# Patient Record
Sex: MALE | Race: WHITE | NOT HISPANIC OR LATINO | Employment: FULL TIME | URBAN - METROPOLITAN AREA
[De-identification: names, ages, dates, MRNs, and addresses within clinical notes are randomized per-mention and may not be internally consistent; named-entity substitution may affect disease eponyms.]

---

## 2024-01-24 ENCOUNTER — APPOINTMENT (OUTPATIENT)
Dept: CARDIOLOGY | Facility: HOSPITAL | Age: 29
End: 2024-01-24
Payer: MEDICAID

## 2024-01-24 ENCOUNTER — HOSPITAL ENCOUNTER (EMERGENCY)
Facility: HOSPITAL | Age: 29
Discharge: PSYCHIATRIC OR SUBSTANCE USE FACILITY | End: 2024-01-25
Attending: STUDENT IN AN ORGANIZED HEALTH CARE EDUCATION/TRAINING PROGRAM
Payer: MEDICAID

## 2024-01-24 VITALS
DIASTOLIC BLOOD PRESSURE: 82 MMHG | RESPIRATION RATE: 18 BRPM | TEMPERATURE: 97.5 F | SYSTOLIC BLOOD PRESSURE: 116 MMHG | OXYGEN SATURATION: 100 % | HEART RATE: 108 BPM

## 2024-01-24 DIAGNOSIS — F25.0 SCHIZOAFFECTIVE DISORDER, BIPOLAR TYPE (MULTI): ICD-10-CM

## 2024-01-24 DIAGNOSIS — N40.1 BENIGN PROSTATIC HYPERPLASIA WITH LOWER URINARY TRACT SYMPTOMS, SYMPTOM DETAILS UNSPECIFIED: ICD-10-CM

## 2024-01-24 DIAGNOSIS — F39 MOOD DISORDER (CMS-HCC): Primary | ICD-10-CM

## 2024-01-24 LAB
ALBUMIN SERPL BCP-MCNC: 4.6 G/DL (ref 3.4–5)
ALP SERPL-CCNC: 70 U/L (ref 33–120)
ALT SERPL W P-5'-P-CCNC: 22 U/L (ref 10–52)
AMPHETAMINES UR QL SCN: ABNORMAL
ANION GAP SERPL CALC-SCNC: 12 MMOL/L (ref 10–20)
APAP SERPL-MCNC: <10 UG/ML
AST SERPL W P-5'-P-CCNC: 17 U/L (ref 9–39)
BARBITURATES UR QL SCN: ABNORMAL
BASOPHILS # BLD AUTO: 0.06 X10*3/UL (ref 0–0.1)
BASOPHILS NFR BLD AUTO: 0.6 %
BENZODIAZ UR QL SCN: ABNORMAL
BILIRUB SERPL-MCNC: 0.4 MG/DL (ref 0–1.2)
BUN SERPL-MCNC: 15 MG/DL (ref 6–23)
BZE UR QL SCN: ABNORMAL
CALCIUM SERPL-MCNC: 9.5 MG/DL (ref 8.6–10.3)
CANNABINOIDS UR QL SCN: ABNORMAL
CHLORIDE SERPL-SCNC: 103 MMOL/L (ref 98–107)
CO2 SERPL-SCNC: 28 MMOL/L (ref 21–32)
CREAT SERPL-MCNC: 1.03 MG/DL (ref 0.5–1.3)
EGFRCR SERPLBLD CKD-EPI 2021: >90 ML/MIN/1.73M*2
EOSINOPHIL # BLD AUTO: 0.12 X10*3/UL (ref 0–0.7)
EOSINOPHIL NFR BLD AUTO: 1.2 %
ERYTHROCYTE [DISTWIDTH] IN BLOOD BY AUTOMATED COUNT: 12.5 % (ref 11.5–14.5)
ETHANOL SERPL-MCNC: <10 MG/DL
FENTANYL+NORFENTANYL UR QL SCN: ABNORMAL
GLUCOSE SERPL-MCNC: 101 MG/DL (ref 74–99)
HCT VFR BLD AUTO: 41.7 % (ref 41–52)
HGB BLD-MCNC: 14 G/DL (ref 13.5–17.5)
IMM GRANULOCYTES # BLD AUTO: 0.05 X10*3/UL (ref 0–0.7)
IMM GRANULOCYTES NFR BLD AUTO: 0.5 % (ref 0–0.9)
LYMPHOCYTES # BLD AUTO: 1.63 X10*3/UL (ref 1.2–4.8)
LYMPHOCYTES NFR BLD AUTO: 15.8 %
MCH RBC QN AUTO: 30.9 PG (ref 26–34)
MCHC RBC AUTO-ENTMCNC: 33.6 G/DL (ref 32–36)
MCV RBC AUTO: 92 FL (ref 80–100)
MONOCYTES # BLD AUTO: 0.77 X10*3/UL (ref 0.1–1)
MONOCYTES NFR BLD AUTO: 7.5 %
NEUTROPHILS # BLD AUTO: 7.68 X10*3/UL (ref 1.2–7.7)
NEUTROPHILS NFR BLD AUTO: 74.4 %
NRBC BLD-RTO: 0 /100 WBCS (ref 0–0)
OPIATES UR QL SCN: ABNORMAL
OXYCODONE+OXYMORPHONE UR QL SCN: ABNORMAL
PCP UR QL SCN: ABNORMAL
PLATELET # BLD AUTO: 234 X10*3/UL (ref 150–450)
POTASSIUM SERPL-SCNC: 4 MMOL/L (ref 3.5–5.3)
PROT SERPL-MCNC: 6.9 G/DL (ref 6.4–8.2)
RBC # BLD AUTO: 4.53 X10*6/UL (ref 4.5–5.9)
SALICYLATES SERPL-MCNC: <3 MG/DL
SARS-COV-2 RNA RESP QL NAA+PROBE: NOT DETECTED
SODIUM SERPL-SCNC: 139 MMOL/L (ref 136–145)
WBC # BLD AUTO: 10.3 X10*3/UL (ref 4.4–11.3)

## 2024-01-24 PROCEDURE — 85025 COMPLETE CBC W/AUTO DIFF WBC: CPT | Performed by: STUDENT IN AN ORGANIZED HEALTH CARE EDUCATION/TRAINING PROGRAM

## 2024-01-24 PROCEDURE — 93005 ELECTROCARDIOGRAM TRACING: CPT

## 2024-01-24 PROCEDURE — 80143 DRUG ASSAY ACETAMINOPHEN: CPT | Performed by: STUDENT IN AN ORGANIZED HEALTH CARE EDUCATION/TRAINING PROGRAM

## 2024-01-24 PROCEDURE — 80053 COMPREHEN METABOLIC PANEL: CPT | Performed by: STUDENT IN AN ORGANIZED HEALTH CARE EDUCATION/TRAINING PROGRAM

## 2024-01-24 PROCEDURE — 86140 C-REACTIVE PROTEIN: CPT | Performed by: NURSE PRACTITIONER

## 2024-01-24 PROCEDURE — 87636 SARSCOV2 & INF A&B AMP PRB: CPT | Performed by: STUDENT IN AN ORGANIZED HEALTH CARE EDUCATION/TRAINING PROGRAM

## 2024-01-24 PROCEDURE — 99285 EMERGENCY DEPT VISIT HI MDM: CPT | Performed by: STUDENT IN AN ORGANIZED HEALTH CARE EDUCATION/TRAINING PROGRAM

## 2024-01-24 PROCEDURE — 87389 HIV-1 AG W/HIV-1&-2 AB AG IA: CPT | Mod: GEALAB | Performed by: NURSE PRACTITIONER

## 2024-01-24 PROCEDURE — 83036 HEMOGLOBIN GLYCOSYLATED A1C: CPT | Mod: GEALAB | Performed by: NURSE PRACTITIONER

## 2024-01-24 PROCEDURE — 36415 COLL VENOUS BLD VENIPUNCTURE: CPT | Performed by: STUDENT IN AN ORGANIZED HEALTH CARE EDUCATION/TRAINING PROGRAM

## 2024-01-24 PROCEDURE — 84443 ASSAY THYROID STIM HORMONE: CPT | Performed by: NURSE PRACTITIONER

## 2024-01-24 PROCEDURE — 80307 DRUG TEST PRSMV CHEM ANLYZR: CPT | Performed by: STUDENT IN AN ORGANIZED HEALTH CARE EDUCATION/TRAINING PROGRAM

## 2024-01-24 RX ORDER — OLANZAPINE 5 MG/1
5 TABLET, ORALLY DISINTEGRATING ORAL ONCE
Status: DISCONTINUED | OUTPATIENT
Start: 2024-01-24 | End: 2024-01-25 | Stop reason: HOSPADM

## 2024-01-24 ASSESSMENT — COLUMBIA-SUICIDE SEVERITY RATING SCALE - C-SSRS
2. HAVE YOU ACTUALLY HAD ANY THOUGHTS OF KILLING YOURSELF?: NO
1. IN THE PAST MONTH, HAVE YOU WISHED YOU WERE DEAD OR WISHED YOU COULD GO TO SLEEP AND NOT WAKE UP?: NO
6. HAVE YOU EVER DONE ANYTHING, STARTED TO DO ANYTHING, OR PREPARED TO DO ANYTHING TO END YOUR LIFE?: NO

## 2024-01-24 ASSESSMENT — LIFESTYLE VARIABLES
HAVE YOU EVER FELT YOU SHOULD CUT DOWN ON YOUR DRINKING: NO
HAVE PEOPLE ANNOYED YOU BY CRITICIZING YOUR DRINKING: NO
EVER HAD A DRINK FIRST THING IN THE MORNING TO STEADY YOUR NERVES TO GET RID OF A HANGOVER: NO
REASON UNABLE TO ASSESS: NO
EVER FELT BAD OR GUILTY ABOUT YOUR DRINKING: NO

## 2024-01-24 ASSESSMENT — PAIN SCALES - GENERAL
PAINLEVEL_OUTOF10: 0 - NO PAIN
PAINLEVEL_OUTOF10: 0 - NO PAIN

## 2024-01-24 ASSESSMENT — PAIN - FUNCTIONAL ASSESSMENT: PAIN_FUNCTIONAL_ASSESSMENT: 0-10

## 2024-01-25 ENCOUNTER — HOSPITAL ENCOUNTER (INPATIENT)
Facility: HOSPITAL | Age: 29
LOS: 8 days | Discharge: HOME | End: 2024-02-02
Attending: PSYCHIATRY & NEUROLOGY | Admitting: PSYCHIATRY & NEUROLOGY
Payer: MEDICAID

## 2024-01-25 DIAGNOSIS — F31.13 BIPOLAR DISORDER WITH SEVERE MANIA (MULTI): Primary | ICD-10-CM

## 2024-01-25 DIAGNOSIS — R33.8 BENIGN PROSTATIC HYPERPLASIA WITH URINARY RETENTION: ICD-10-CM

## 2024-01-25 DIAGNOSIS — E55.9 VITAMIN D DEFICIENCY: ICD-10-CM

## 2024-01-25 DIAGNOSIS — N40.1 BENIGN PROSTATIC HYPERPLASIA WITH URINARY RETENTION: ICD-10-CM

## 2024-01-25 PROBLEM — F17.200 NICOTINE DEPENDENCE, UNSPECIFIED, UNCOMPLICATED: Status: ACTIVE | Noted: 2018-09-24

## 2024-01-25 PROBLEM — F23 ACUTE PSYCHOSIS (MULTI): Status: ACTIVE | Noted: 2024-01-25

## 2024-01-25 PROBLEM — F12.90 CANNABIS USE, UNSPECIFIED, UNCOMPLICATED: Status: ACTIVE | Noted: 2018-09-24

## 2024-01-25 PROBLEM — F30.9: Status: ACTIVE | Noted: 2018-09-24

## 2024-01-25 LAB
FLUAV RNA RESP QL NAA+PROBE: NOT DETECTED
FLUBV RNA RESP QL NAA+PROBE: NOT DETECTED

## 2024-01-25 PROCEDURE — 2500000004 HC RX 250 GENERAL PHARMACY W/ HCPCS (ALT 636 FOR OP/ED): Performed by: INTERNAL MEDICINE

## 2024-01-25 PROCEDURE — 99221 1ST HOSP IP/OBS SF/LOW 40: CPT | Performed by: INTERNAL MEDICINE

## 2024-01-25 PROCEDURE — 1240000001 HC SEMI-PRIVATE BH ROOM DAILY

## 2024-01-25 RX ORDER — DIPHENHYDRAMINE HYDROCHLORIDE 50 MG/ML
50 INJECTION INTRAMUSCULAR; INTRAVENOUS ONCE AS NEEDED
Status: DISCONTINUED | OUTPATIENT
Start: 2024-01-25 | End: 2024-02-02 | Stop reason: HOSPADM

## 2024-01-25 RX ORDER — LORAZEPAM 2 MG/ML
2 INJECTION INTRAMUSCULAR EVERY 6 HOURS PRN
Status: DISCONTINUED | OUTPATIENT
Start: 2024-01-25 | End: 2024-02-02 | Stop reason: HOSPADM

## 2024-01-25 RX ORDER — TAMSULOSIN HYDROCHLORIDE 0.4 MG/1
0.4 CAPSULE ORAL DAILY
Status: DISCONTINUED | OUTPATIENT
Start: 2024-01-25 | End: 2024-02-02 | Stop reason: HOSPADM

## 2024-01-25 RX ORDER — ACETAMINOPHEN 500 MG
5 TABLET ORAL NIGHTLY PRN
Status: DISCONTINUED | OUTPATIENT
Start: 2024-01-25 | End: 2024-02-02 | Stop reason: HOSPADM

## 2024-01-25 RX ORDER — LORAZEPAM 1 MG/1
2 TABLET ORAL EVERY 6 HOURS PRN
Status: DISCONTINUED | OUTPATIENT
Start: 2024-01-25 | End: 2024-02-02 | Stop reason: HOSPADM

## 2024-01-25 RX ORDER — DIPHENHYDRAMINE HCL 50 MG
50 CAPSULE ORAL EVERY 6 HOURS PRN
Status: DISCONTINUED | OUTPATIENT
Start: 2024-01-25 | End: 2024-02-02 | Stop reason: HOSPADM

## 2024-01-25 RX ORDER — HALOPERIDOL 5 MG/1
5 TABLET ORAL EVERY 6 HOURS PRN
Status: DISCONTINUED | OUTPATIENT
Start: 2024-01-25 | End: 2024-02-02 | Stop reason: HOSPADM

## 2024-01-25 RX ORDER — HYDROXYZINE PAMOATE 50 MG/1
50 CAPSULE ORAL EVERY 4 HOURS PRN
Status: DISCONTINUED | OUTPATIENT
Start: 2024-01-25 | End: 2024-01-26

## 2024-01-25 RX ORDER — HALOPERIDOL 5 MG/ML
5 INJECTION INTRAMUSCULAR EVERY 6 HOURS PRN
Status: DISCONTINUED | OUTPATIENT
Start: 2024-01-25 | End: 2024-02-02 | Stop reason: HOSPADM

## 2024-01-25 RX ORDER — ALUMINUM HYDROXIDE, MAGNESIUM HYDROXIDE, AND SIMETHICONE 2400; 240; 2400 MG/30ML; MG/30ML; MG/30ML
30 SUSPENSION ORAL EVERY 6 HOURS PRN
Status: DISCONTINUED | OUTPATIENT
Start: 2024-01-25 | End: 2024-02-02 | Stop reason: HOSPADM

## 2024-01-25 RX ORDER — ACETAMINOPHEN 325 MG/1
650 TABLET ORAL EVERY 4 HOURS PRN
Status: DISCONTINUED | OUTPATIENT
Start: 2024-01-25 | End: 2024-02-02 | Stop reason: HOSPADM

## 2024-01-25 RX ADMIN — TAMSULOSIN HYDROCHLORIDE 0.4 MG: 0.4 CAPSULE ORAL at 15:29

## 2024-01-25 SDOH — SOCIAL STABILITY: SOCIAL NETWORK: VISITOR BEHAVIORS: OTHER (COMMENT)

## 2024-01-25 SDOH — SOCIAL STABILITY: SOCIAL INSECURITY: ARE YOU OR HAVE YOU BEEN THREATENED OR ABUSED PHYSICALLY, EMOTIONALLY, OR SEXUALLY BY ANYONE?: NO

## 2024-01-25 SDOH — HEALTH STABILITY: MENTAL HEALTH

## 2024-01-25 SDOH — HEALTH STABILITY: MENTAL HEALTH: HAVE YOU WISHED YOU WERE DEAD OR WISHED YOU COULD GO TO SLEEP AND NOT WAKE UP?: NO

## 2024-01-25 SDOH — SOCIAL STABILITY: SOCIAL INSECURITY: WERE YOU ABLE TO COMPLETE ALL THE BEHAVIORAL HEALTH SCREENINGS?: YES

## 2024-01-25 SDOH — SOCIAL STABILITY: SOCIAL NETWORK: PARENT/GUARDIAN/SIGNIFICANT OTHER INVOLVEMENT: NO INVOLVEMENT

## 2024-01-25 SDOH — SOCIAL STABILITY: SOCIAL INSECURITY: DOES ANYONE TRY TO KEEP YOU FROM HAVING/CONTACTING OTHER FRIENDS OR DOING THINGS OUTSIDE YOUR HOME?: NO

## 2024-01-25 SDOH — SOCIAL STABILITY: SOCIAL INSECURITY: HAS ANYONE EVER THREATENED TO HURT YOUR FAMILY OR YOUR PETS?: NO

## 2024-01-25 SDOH — HEALTH STABILITY: MENTAL HEALTH: BEHAVIORS/MOOD: NON-COMPLIANT;WANDERING;DELUSIONS

## 2024-01-25 SDOH — HEALTH STABILITY: MENTAL HEALTH: SUICIDE ASSESSMENT: ADULT (C-SSRS)

## 2024-01-25 SDOH — SOCIAL STABILITY: SOCIAL INSECURITY: DO YOU FEEL ANYONE HAS EXPLOITED OR TAKEN ADVANTAGE OF YOU FINANCIALLY OR OF YOUR PERSONAL PROPERTY?: NO

## 2024-01-25 SDOH — SOCIAL STABILITY: SOCIAL INSECURITY: ARE THERE ANY APPARENT SIGNS OF INJURIES/BEHAVIORS THAT COULD BE RELATED TO ABUSE/NEGLECT?: NO

## 2024-01-25 SDOH — SOCIAL STABILITY: SOCIAL INSECURITY: ABUSE: ADULT

## 2024-01-25 SDOH — HEALTH STABILITY: MENTAL HEALTH: HAVE YOU ACTUALLY HAD ANY THOUGHTS OF KILLING YOURSELF?: NO

## 2024-01-25 SDOH — HEALTH STABILITY: MENTAL HEALTH: CONTENT: PREOCCUPATION

## 2024-01-25 SDOH — SOCIAL STABILITY: SOCIAL INSECURITY: DO YOU FEEL UNSAFE GOING BACK TO THE PLACE WHERE YOU ARE LIVING?: NO

## 2024-01-25 SDOH — HEALTH STABILITY: MENTAL HEALTH: HAVE YOU EVER DONE ANYTHING, STARTED TO DO ANYTHING, OR PREPARED TO DO ANYTHING TO END YOUR LIFE?: NO

## 2024-01-25 SDOH — HEALTH STABILITY: MENTAL HEALTH: SLEEP PATTERN: RESTLESSNESS;INSOMNIA

## 2024-01-25 SDOH — SOCIAL STABILITY: SOCIAL INSECURITY: FAMILY BEHAVIORS: NON-SUPPORTIVE

## 2024-01-25 SDOH — SOCIAL STABILITY: SOCIAL INSECURITY: HAVE YOU HAD THOUGHTS OF HARMING ANYONE ELSE?: NO

## 2024-01-25 ASSESSMENT — LIFESTYLE VARIABLES
SUBSTANCE_ABUSE_PAST_12_MONTHS: NO
AUDITORY DISTURBANCES: NOT PRESENT
PAROXYSMAL SWEATS: NO SWEAT VISIBLE
VISUAL DISTURBANCES: NOT PRESENT
AUDIT-C TOTAL SCORE: 0
HEADACHE, FULLNESS IN HEAD: NOT PRESENT
NAUSEA AND VOMITING: NO NAUSEA AND NO VOMITING
CIWA TOTAL SCORE: 0
HOW OFTEN DO YOU HAVE 6 OR MORE DRINKS ON ONE OCCASION: NEVER
ORIENTATION AND CLOUDING OF SENSORIUM: ORIENTED AND CAN DO SERIAL ADDITIONS
HOW MANY STANDARD DRINKS CONTAINING ALCOHOL DO YOU HAVE ON A TYPICAL DAY: PATIENT DOES NOT DRINK
AUDIT-C TOTAL SCORE: 0
SKIP TO QUESTIONS 9-10: 1
AGITATION: NORMAL ACTIVITY
PRESCIPTION_ABUSE_PAST_12_MONTHS: NO
ANXIETY: NO ANXIETY, AT EASE
TOTAL_SCORE: 0
TREMOR: NO TREMOR
HOW OFTEN DO YOU HAVE A DRINK CONTAINING ALCOHOL: NEVER

## 2024-01-25 ASSESSMENT — ACTIVITIES OF DAILY LIVING (ADL)
HEARING - RIGHT EAR: FUNCTIONAL
JUDGMENT_ADEQUATE_SAFELY_COMPLETE_DAILY_ACTIVITIES: YES
LACK_OF_TRANSPORTATION: NO
FEEDING YOURSELF: INDEPENDENT
HEARING - LEFT EAR: FUNCTIONAL
PATIENT'S MEMORY ADEQUATE TO SAFELY COMPLETE DAILY ACTIVITIES?: YES
BATHING: INDEPENDENT
TOILETING: INDEPENDENT
WALKS IN HOME: INDEPENDENT
DRESSING YOURSELF: INDEPENDENT
GROOMING: INDEPENDENT
ADEQUATE_TO_COMPLETE_ADL: YES

## 2024-01-25 ASSESSMENT — COLUMBIA-SUICIDE SEVERITY RATING SCALE - C-SSRS
6. HAVE YOU EVER DONE ANYTHING, STARTED TO DO ANYTHING, OR PREPARED TO DO ANYTHING TO END YOUR LIFE?: NO
1. SINCE LAST CONTACT, HAVE YOU WISHED YOU WERE DEAD OR WISHED YOU COULD GO TO SLEEP AND NOT WAKE UP?: NO
6. HAVE YOU EVER DONE ANYTHING, STARTED TO DO ANYTHING, OR PREPARED TO DO ANYTHING TO END YOUR LIFE?: NO
2. HAVE YOU ACTUALLY HAD ANY THOUGHTS OF KILLING YOURSELF?: NO
1. SINCE LAST CONTACT, HAVE YOU WISHED YOU WERE DEAD OR WISHED YOU COULD GO TO SLEEP AND NOT WAKE UP?: NO
2. HAVE YOU ACTUALLY HAD ANY THOUGHTS OF KILLING YOURSELF?: NO

## 2024-01-25 ASSESSMENT — PATIENT HEALTH QUESTIONNAIRE - PHQ9
SUM OF ALL RESPONSES TO PHQ9 QUESTIONS 1 & 2: 0
2. FEELING DOWN, DEPRESSED OR HOPELESS: NOT AT ALL
1. LITTLE INTEREST OR PLEASURE IN DOING THINGS: NOT AT ALL

## 2024-01-25 ASSESSMENT — PAIN SCALES - GENERAL
PAINLEVEL_OUTOF10: 0 - NO PAIN
PAINLEVEL_OUTOF10: 10 - WORST POSSIBLE PAIN

## 2024-01-25 ASSESSMENT — PAIN - FUNCTIONAL ASSESSMENT
PAIN_FUNCTIONAL_ASSESSMENT: 0-10
PAIN_FUNCTIONAL_ASSESSMENT: WONG-BAKER FACES

## 2024-01-25 NOTE — PROGRESS NOTES
" REHAB Therapy Assessment & Treatment    Patient Name: Moris Conklin  MRN: [id number]  Today's Date: 1/25/2024      Activity Assessment:  Initial Assessment  Attention Span: 5-15 Minutes  Cognitive Behavior Status/Orientation: Capable, Place, Time, Person  Crisis Triggers: Emotions, Mood (\"not having time, not having my routine\")  Emotional Concerns/Mood/Affect: Alert, Participative  Hearing: Adequate  Memory: Memory intact  Motivation Level: Moderate encouragement needed  Negative Coping Skills: Substance use (THC, isolation)  Speech/Communication/Socialization: Verbal  Vision: Adequate    Leisure Survey:   Rehab Leisure Interest Survey  Activity Preference: Independent  Activity Tolerance: Fair 15-30 minutes  At Home ADL Deficits:  (not tending to personal hygeine)  Barriers to Leisure Participation: Behaviors, Emotions, Mood/affect, Thought process, Lack of time  Community Resources: Active in community groups (Abdirashid)  Following Directions: Able to follow multi-step commands  Leisure Interests: Actively participates in leisure interests  Living Arrangement:  (family)  Motivators for Recreation/Leisure Involvement: Relaxation, Self-esteem/sense of accomplishment, Fun/entertainment, Sense of well being/contentment  Outdoor Activities:  (outdoors)  Passive Games: Video games  Patient/Family Education Needs: safety awareness  Patient Strengths: support system  Patient Weakness: isolation  Physial Activity: Fitness/exercise (MMA, wrestled in high school)  Social/Group Activities: Team sports  Solitary Activities: Music (nicole)  Work/Volunteer: \"work a lot\"- food delivery      Encounter Problems       Encounter Problems (Active)       Distress Tolerance  RT       To learn ways to manage stress       Start:  01/25/24    Expected End:  02/01/24               Leisure (appropriate exploration/participation)       Explore/identify 1-2 potentially meaniningful leisure activities for post discharge       Start:  " 01/25/24    Expected End:  02/01/24               Social       Stimulation       Start:  01/25/24    Expected End:  02/01/24

## 2024-01-25 NOTE — NURSING NOTE
Pt arrived to the unit and was cooperative with admission process and paperwork. Patient presents as irritable but cooperative. Pt informed of their psychiatric pt rights and had all questions and concerns addressed to their satisfaction. Pt oriented to unit. Will continue to monitor.

## 2024-01-25 NOTE — CONSULTS
Reason For Consult  Medical management    History Of Present Illness  Moris Conklin is a 28 y.o. male presenting with acute bipolar disorder admitted to psych floor involuntarily.  The patient was seen on the floor denies any past medical history.  The patient states he was doing just fine until he was moved with a roommate and since then he has not been able to urinate properly.  Per the patient he urinated about 10 minutes ago and everything was fine.  The patient states he has lower abdominal pain that turns into GERD and then he also has migraines.  Patient denies any fever chills nausea or vomiting or diarrhea at this time.  He is in no acute distress.  Medicine has been consulted for his difficulty urination.     Past Medical History  He has no past medical history on file.    Surgical History  He has no past surgical history on file.     Social History  He reports that he has never smoked. He has never used smokeless tobacco. No history on file for alcohol use and drug use.    Family History  No family history on file.     Allergies  Patient has no known allergies.    Review of Systems  Review of system is unremarkable except for was documented above     Physical Exam  General: Unkempt, no acute distress, alert and oriented  HEENT: PERRLA, head is normocephalic atraumatic,  Heart: Regular rate and rhythm no murmurs  Lungs: Clear to auscultate bilaterally  Abdomen: Nontender nondistended.  Nontender to deep palpation in all 4 quadrants  Psych: Depressed       Last Recorded Vitals  /76 (BP Location: Right arm, Patient Position: Sitting)   Pulse 68   Temp 36.4 °C (97.5 °F) (Temporal)   Resp 18   Wt 65.8 kg (145 lb)   SpO2 97%        Assessment/Plan     Urinary hesitancy  -Added Flomax.   -Bladder scan q. shifts for next 24 hours  -UA reviewed which is unremarkable except for proteinuria that needs outpatient follow-up.    Bipolar disorder  -Per psychiatry.    Thank you for allowing us to  participate in the care of this patient.  Will continue to follow along.  Feel free to call us and preferably send message on epic chat with any questions or concerns.    Luis Eduardo Florentino MD

## 2024-01-25 NOTE — PROGRESS NOTES
Pharmacy Medication History Review    Moris Conklin is a 28 y.o. male admitted for No Principal Problem: There is no principal problem currently on the Problem List. Please update the Problem List and refresh.. Pharmacy reviewed the patient's [id number] medications and allergies for accuracy.    The list below reflectives the updated PTA list. Please review each medication in order reconciliation for additional clarification and justification.  Prior to Admission medications    Not on File        The list below reflectives the updated allergy list. Please review each documented allergy for additional clarification and justification.  Allergies  Reviewed by Wilfrido Gamble RN on 1/24/2024   No Known Allergies         Below are additional concerns with the patient's PTA list.      Kendal Arnold CPhT

## 2024-01-25 NOTE — PROGRESS NOTES
ED care transition note      Moris Katerin  28 y.o.       I took over care of this patient at 0600    Pt signed out to me pending EPAT placement.  Signed out as medically clear.  Referred over from Jenn Grace with concern for acute psychosis and internal stimulation.  Has a history of manic episodes.    No new issues while in the emergency department.  Accepted to the [facility].          1. Mood disorder (CMS/HCC)             Vitals:    01/24/24 1716   BP: 116/82   Pulse: (!) 108   Resp: 18   Temp: 36.4 °C (97.5 °F)   SpO2: 100%        No orders to display      Labs Reviewed   COMPREHENSIVE METABOLIC PANEL - Abnormal       Result Value    Glucose 101 (*)     Sodium 139      Potassium 4.0      Chloride 103      Bicarbonate 28      Anion Gap 12      Urea Nitrogen 15      Creatinine 1.03      eGFR >90      Calcium 9.5      Albumin 4.6      Alkaline Phosphatase 70      Total Protein 6.9      AST 17      Bilirubin, Total 0.4      ALT 22     DRUG SCREEN,URINE - Abnormal    Amphetamine Screen, Urine Presumptive Negative      Barbiturate Screen, Urine Presumptive Negative      Benzodiazepines Screen, Urine Presumptive Negative      Cannabinoid Screen, Urine Presumptive Positive (*)     Cocaine Metabolite Screen, Urine Presumptive Negative      Fentanyl Screen, Urine Presumptive Negative      Opiate Screen, Urine Presumptive Negative      Oxycodone Screen, Urine Presumptive Negative      PCP Screen, Urine Presumptive Negative      Narrative:     Drug screen results are presumptive and should not be used to assess   compliance with prescribed medication. Contact the performing [facility] laboratory   to add-on definitive confirmatory testing if clinically indicated.    Toxicology screening results are reported qualitatively. The concentration must   be greater than or equal to the cutoff to be reported as positive. The concentration   at which the screening test can detect an individual drug or metabolite varies.   The absence  of expected drug(s) and/or drug metabolite(s) may indicate non-compliance,   inappropriate timing of specimen collection relative to drug administration, poor drug   absorption, diluted/adulterated urine, or limitations of testing. For medical purposes   only; not valid for forensic use.    Interpretive questions should be directed to the laboratory medical directors.   ACUTE TOXICOLOGY PANEL, BLOOD - Normal    Acetaminophen <10.0      Salicylate  <3      Alcohol <10     SARS-COV-2 PCR, SCREEN ASYMPTOMATIC - Normal    Coronavirus 2019, PCR Not Detected      Narrative:     This assay has received FDA Emergency Use Authorization (EUA) and is only authorized for the duration of time that circumstances exist to justify the authorization of the emergency use of in vitro diagnostic tests for the detection of SARS-CoV-2 virus and/or diagnosis of COVID-19 infection under section 564(b)(1) of the Act, 21 U.S.C. 360bbb-3(b)(1). This assay is an in vitro diagnostic nucleic acid amplification test for the qualitative detection of SARS-CoV-2 from nasopharyngeal specimens and has been validated for use at [facility]. Negative results do not preclude COVID-19 infections and should not be used as the sole basis for diagnosis, treatment, or other management decisions.     INFLUENZA A AND B PCR - Normal    Flu A Result Not Detected      Flu B Result Not Detected      Narrative:     This assay is an in vitro diagnostic multiplex nucleic acid amplification test for the detection and discrimination of Influenza A & B from nasopharyngeal specimens, and has been validated for use at [facility]. Negative results do not preclude Influenza A/B infections, and should not be used as the sole basis for diagnosis, treatment, or other management decisions. If Influenza A/B and RSV PCR results are negative, testing for Parainfluenza virus, Adenovirus and Metapneumovirus is routinely performed for [facility]  pediatric oncology and intensive care inpatients, and is available on other patients by placing an add-on request.   CBC WITH AUTO DIFFERENTIAL    WBC 10.3      nRBC 0.0      RBC 4.53      Hemoglobin 14.0      Hematocrit 41.7      MCV 92      MCH 30.9      MCHC 33.6      RDW 12.5      Platelets 234      Neutrophils % 74.4      Immature Granulocytes %, Automated 0.5      Lymphocytes % 15.8      Monocytes % 7.5      Eosinophils % 1.2      Basophils % 0.6      Neutrophils Absolute 7.68      Immature Granulocytes Absolute, Automated 0.05      Lymphocytes Absolute 1.63      Monocytes Absolute 0.77      Eosinophils Absolute 0.12      Basophils Absolute 0.06          Dominique Trujillo MD

## 2024-01-25 NOTE — CONSULTS
"BEHAVIORAL HEALTH INITIAL CONSULTATION NOTE    Referring Provider: Yamilex    Consultation information:  Consults   Visit type: Virtual evaluation    HISTORY OF PRESENT ILLNESS:  Moris Conklin is a 28 y.o. male, hx of Bipolar I disorder (managed most recently by [facility] on Zyprexa 5mg PO daily but lost to followup 2/2 payment issues for ~1.5 years), pink slipped from [facility] to [facility] ED for c/o depression with neurovegetative sx, with the psychiatry CL service consulting thus.    Per ambulance record (no triage note nor ED note for this encounter), [facility] therapist called ambulance after patient was noted to be socially withdrawing, not caring for self, and increasing non-psychiatric complaints re: urination difficulty and hemorrhoids. Historically seen by [facility] psychiatrist Julieth Fitzpatrick but lost to follow up since 6/16/22 after insurance concerns.    Patient evaluated virtually. \"I'm very sick. Mentally I'm fine. I'm here for a misinterpretation. It was a provoked outburst... the last person told me I'm fine then they showed up at my door... of course I'm not perfect because I'm sick but I'm fine... I'm on the road to recovery... from groin pains.\" Re: bipolar: \"it's happenstance... circumstance... I have to just diet and eat well and take medical medications... not psychiatric\" and confirmed no current psychopharm. Noted extreme goal-directed behavior; \"not even time to go to the doctor... I go to work! I work a lot. 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 then I do important hobbies... to stimulate my mind, body and soul... I do MMA, music, entertainment, and nicole.\" So also goal-directed that way. When asked about sleep was guarded... \"I go to sleep pretty late and wake up at 4 for work... but only work 4 hours...\" Denied substance use. When asked about self care was also guarded, \"I'm on the road to doing that!\" Then perseverated on \"prescriptions... hemorrhoidal ointment and " "I'm going keto.\" No current psychiatrist but still therapy. Denied past SA nor inpatient psych visits. Denied SI/HI/AVH currently. Re: past meds: \"I was on zyprexa for 6 months but I only took it 2 months; it didn't help but they said I just didn't take it right.\"    Edd [phone] (mother; patient provided verbal consent for collateral)  \"He's doing bad... he can't pee, he has chest pain, headache, and hemorrhoids. Emotionally, I don't know where to begin... not showering, not brushing teeth, not eating right, not sleeping, he games a lot... talking about things that aren't there like [employer].\" She confirmed goal directed behavior and no sleep. Re psychotic features, \"he's having bad thoughts about his stepfather... he goes on rants at 3-6 AM, not showering, not seeing a doctor even though I try. He's been through [facility] and [facility].\" Never inpatient nor SA. She stated she is actually working on potentially gaining partial court appointed medical decision-maker.    Past Psychiatric History  Current/Previous Diagnoses:  Bipolar disorder by history  Current Psychiatrist/Provider:  None but most recently [facility]  Current Therapist:  Abdirashid  Other Providers / Agencies: Denies  Outpatient Treatment History:  as above  Past Medication Trials:  Zoloft (unclear), zyprexa (noncompliant)  Inpatient Hospitalizations: Denies  Suicide Attempts: Denies  Homicide attempts/Violence: Denies  Self Harm/Self Injurious: Denies    Substance Abuse History  Tobacco use history: Denies  Alcohol use history: Denies  Cannabis use history:  hx; not current per patient but positive tox  Illicit Drug Use History: Denies    Social History  He has no history on file for tobacco use, alcohol use, and drug use.  Household: lives with mother, stepfather.  Occupation:  for food delivery  Hobbies/interests/coping: many but unclear  Legal hx: Denies  History of trauma/abuse: Denies however high index of suspicion given multiple  complaints " "and vague psychotic vs dissociative sx  Weapons at home and access to lethal means: Denies    OARRS REVIEW  OARRS checked: No data recorded   OARRS comments: No disps    Past Medical History  He has no past medical history on file.  Hemorrhoids, urinary dysfunction    ALLERGIES  Patient has no known allergies.    Surgical History  He has no past surgical history on file.    FAMILY HISTORY  No family history on file.     PSYCHIATRIC REVIEW OF SYSTEMS  Depression: markedly diminished interest or pleasure in all or most activities and psychomotor agitation or retardation  Anxiety: excessive worry that is difficult to control, irritability, muscle tension, and sleep disturbance  Michelle: expansive or irritable mood , increased goal-directed activity , inflated self-esteem or grandiosity , decreased need for sleep, talkativeness or pressured speech, and flight of ideas or racing thoughts  Psychosis: delusions: grandiosity, disorganized speech, and disorganized thought   Delirium: negative   Trauma: negative    OBJECTIVE    VITALS      1/24/2024     5:16 PM   Vitals   Systolic 116   Diastolic 82   Heart Rate 108   Temp 36.4 °C (97.5 °F)   Resp 18      There is no height or weight on file to calculate BMI.  No height and weight on file for this encounter.  Wt Readings from Last 4 Encounters:   No data found for Wt       Mental Status Exam  General: NAD W M seated comfortably during interview.  Appearance: Appeared as age stated; appropriately dressed/groomed.  Attitude: Guarded and superficially cooperative  Behavior: Fair EC; overall responding appropriately  Motor Activity: No notable ciro PMAR  Speech:  Rapid and somewhat pressured  Mood: \"I want to leave\"  Affect: Increased range and intensity; angry; at times labile and inappropriate  Thought Process: Perseverating at tangential  Thought Content: Denied SI/HI. Voiced delusions per above.  Thought Perception: Did not appear to be responding to internal stimuli. Not " endorsing [facility]  Cognition: Grossly intact; A&O x4/4 to self, place, date, and context.  Insight: Limited  Judgement: Poor    HOME MEDICATIONS  Medication Documentation Review Audit    **Prior to Admission medications have not yet been reviewed**          CURRENT MEDICATIONS  Scheduled medications      Continuous medications      PRN medications       LABS  Admission on 01/24/2024   Component Date Value Ref Range Status    WBC 01/24/2024 10.3  4.4 - 11.3 x10*3/uL Final    nRBC 01/24/2024 0.0  0.0 - 0.0 /100 WBCs Final    RBC 01/24/2024 4.53  4.50 - 5.90 x10*6/uL Final    Hemoglobin 01/24/2024 14.0  13.5 - 17.5 g/dL Final    Hematocrit 01/24/2024 41.7  41.0 - 52.0 % Final    MCV 01/24/2024 92  80 - 100 fL Final    MCH 01/24/2024 30.9  26.0 - 34.0 pg Final    MCHC 01/24/2024 33.6  32.0 - 36.0 g/dL Final    RDW 01/24/2024 12.5  11.5 - 14.5 % Final    Platelets 01/24/2024 234  150 - 450 x10*3/uL Final    Neutrophils % 01/24/2024 74.4  40.0 - 80.0 % Final    Immature Granulocytes %, Automated 01/24/2024 0.5  0.0 - 0.9 % Final    Immature Granulocyte Count (IG) includes promyelocytes, myelocytes and metamyelocytes but does not include bands. Percent differential counts (%) should be interpreted in the context of the absolute cell counts (cells/UL).    Lymphocytes % 01/24/2024 15.8  13.0 - 44.0 % Final    Monocytes % 01/24/2024 7.5  2.0 - 10.0 % Final    Eosinophils % 01/24/2024 1.2  0.0 - 6.0 % Final    Basophils % 01/24/2024 0.6  0.0 - 2.0 % Final    Neutrophils Absolute 01/24/2024 7.68  1.20 - 7.70 x10*3/uL Final    Percent differential counts (%) should be interpreted in the context of the absolute cell counts (cells/uL).    Immature Granulocytes Absolute, Au* 01/24/2024 0.05  0.00 - 0.70 x10*3/uL Final    Lymphocytes Absolute 01/24/2024 1.63  1.20 - 4.80 x10*3/uL Final    Monocytes Absolute 01/24/2024 0.77  0.10 - 1.00 x10*3/uL Final    Eosinophils Absolute 01/24/2024 0.12  0.00 - 0.70 x10*3/uL Final    Basophils  Absolute 01/24/2024 0.06  0.00 - 0.10 x10*3/uL Final    Glucose 01/24/2024 101 (H)  74 - 99 mg/dL Final    Sodium 01/24/2024 139  136 - 145 mmol/L Final    Potassium 01/24/2024 4.0  3.5 - 5.3 mmol/L Final    Chloride 01/24/2024 103  98 - 107 mmol/L Final    Bicarbonate 01/24/2024 28  21 - 32 mmol/L Final    Anion Gap 01/24/2024 12  10 - 20 mmol/L Final    Urea Nitrogen 01/24/2024 15  6 - 23 mg/dL Final    Creatinine 01/24/2024 1.03  0.50 - 1.30 mg/dL Final    eGFR 01/24/2024 >90  >60 mL/min/1.73m*2 Final    Calculations of estimated GFR are performed using the 2021 CKD-EPI Study Refit equation without the race variable for the IDMS-Traceable creatinine methods.  https://jasn.asnjournals.org/content/early/2021/09/22/ASN.7307840658    Calcium 01/24/2024 9.5  8.6 - 10.3 mg/dL Final    Albumin 01/24/2024 4.6  3.4 - 5.0 g/dL Final    Alkaline Phosphatase 01/24/2024 70  33 - 120 U/L Final    Total Protein 01/24/2024 6.9  6.4 - 8.2 g/dL Final    AST 01/24/2024 17  9 - 39 U/L Final    Bilirubin, Total 01/24/2024 0.4  0.0 - 1.2 mg/dL Final    ALT 01/24/2024 22  10 - 52 U/L Final    Patients treated with Sulfasalazine may generate falsely decreased results for ALT.    Amphetamine Screen, Urine 01/24/2024 Presumptive Negative  Presumptive Negative Final    CUTOFF LEVEL: 500 NG/ML   Cross-reactivity has been reported with high concentrations   of the following drugs: buproprion, chloroquine, chlorpromazine,   ephedrine, mephentermine, fenfluramine, phentermine,   phenylpropanolamine, pseudoephedrine, and propranolol.    Barbiturate Screen, Urine 01/24/2024 Presumptive Negative  Presumptive Negative Final    CUTOFF LEVEL: 200 NG/ML    Benzodiazepines Screen, Urine 01/24/2024 Presumptive Negative  Presumptive Negative Final    CUTOFF LEVEL: 200 NG/ML    Cannabinoid Screen, Urine 01/24/2024 Presumptive Positive (A)  Presumptive Negative Final    CUTOFF LEVEL: 50 NG/ML    Cocaine Metabolite Screen, Urine 01/24/2024 Presumptive  Negative  Presumptive Negative Final    CUTOFF LEVEL: 150 NG/ML    Fentanyl Screen, Urine 01/24/2024 Presumptive Negative  Presumptive Negative Final    CUTOFF LEVEL: 5 NG/ML    Opiate Screen, Urine 01/24/2024 Presumptive Negative  Presumptive Negative Final    CUTOFF LEVEL: 300 NG/ML  The opiate screen does not detect fentanyl, meperidine, or   tramadol. Oxycodone is not consistently detected (refer to  Oxycodone Screen, Urine result).    Oxycodone Screen, Urine 01/24/2024 Presumptive Negative  Presumptive Negative Final    CUTOFF LEVEL: 100 NG/ML  This test will accurately detect both oxycodone and oxymorphone.    PCP Screen, Urine 01/24/2024 Presumptive Negative  Presumptive Negative Final    CUTOFF LEVEL:  25 NG/ML  Cross-reactivity has been reported with dextromethorphan.    Acetaminophen 01/24/2024 <10.0  10.0 - 30.0 ug/mL Final    Salicylate  01/24/2024 <3  4 - 20 mg/dL Final    Alcohol 01/24/2024 <10  <=10 mg/dL Final    Coronavirus 2019, PCR 01/24/2024 Not Detected  Not Detected Final       IMAGING  None    PSYCHIATRIC RISK ASSESSMENT  Violence Risk Factors:  current psychiatric illness, persecutory delusions, and lack of insight  Acute Risk of Harm to Others is Considered: Low  Suicide Risk Factors: male, ; /[place] native, chronic medical illness, chronic pain, current psychiatric illness, life crisis (shame/despair), global insomnia, mood congruent delusions, severe anxiety, akathisia, or panic, anxious ruminations, lack of treatment access, discontinuities in treatment, or recent discharge from hospital, and nonadherence to medication treatment for psychosis   Protective Factors: sense of responsibility towards family  Acute Risk of Harm to Self is Considered: High    Assessment/Plan   Active Problems:  There are no active Hospital Problems.        Assessment:  Moris Conklin is a 28 y.o. male, hx of Bipolar I disorder (managed most recently by [facility] on Zyprexa 5mg PO daily but  lost to followup 2/2 payment issues for ~1.5 years), pink slipped from [facility] to [facility] ED for c/o depression with neurovegetative sx, with the psychiatry CL service consulting thus.    Based on above risk and protective factors, patient appears to be a chronically Low risk to self and Low risk to others, and with acute elevation to risk self per above, but without apparent acute elevation in risk to others.    Patient presenting with notable goal directed behavior, labile mood, decreased need for sleep, disorganized thought/speech, and worsening care for self and cor his chronic medical problems. While acute on chronic kathe/bipolar disorder as previously diagnosed is very much at the top of differential, given urogenital and rectal preoccupation re: chronic pain, as well as nonspecific disorganized (manic vs psychotic vs dissociative) concerns, an un-shared significant trauma-related component could also be contributing. In any case, patient presenting with ciro decrease in self-care and increased risk. As such, given the patient's acute elevation in risk that appears attributable to apparent exacerbation of underlying psychiatric conditions (bipolar disorder), the patient appears at this time to require inpatient psychiatric level of care for acute safety and stabilization, and this appears certainly the least restrictive setting for this admission. Patient is thus recommended for inpatient psychiatric level of care. Patient offered potential consent process for Olanzapine resumption and expressly declined this.    Impression:  - Bipolar disorder  - Consider other specified trauma-related or psychotic component    Recs:  - Patient MEETS criteria for inpatient psychiatric admission per above  - Patient is pending placement to inpatient psychiatric level of care by the [facility] service.  - Please do not issue involuntary committal (pink slip) until notified by [facility] that an inpatient bed has been secured.     "-- To place an involuntary order (\"pink slip\") in EPIC, search \"Application for Emergency Admission\" under SmartText.\"  - Patient may not leave AMA, call CODE VIOLET if patient attempts to elope.   - Please utilize capacity tool note template in EPIC on case-by-case basis   -- To utilize EPIC capacity tool, search \" IP CAPACITY EVALUATION\" under [employer] unless the patient has a legal guardian, in which case all decisions per the legal guardian. Psychiatry is always available by phone to provide assistance in using this tool.   -- As a reminder, capacity applies to one decision at a given time. If patient's capacity is assessed for more than one decision, or on more than one occasion, these should be documented in separate notes.   - Patient should be in hospital attire. Please remove/secure personal belongings from the room.  - Continue 1:1 sitter from a psychiatric perspective at this time.  - Medications: would continue to offer consent process for Zyprexa 5mg PO daily  - Pharmacologic PRN recommendations:    -- Please utilize the agitation order set in Epic on a case-by-case basis  - Please page the psychiatry CL team ([phone]) if additional questions arise  - Above recs communicated with primary team          I spent 60 minutes in the professional and overall care of this patient.      Medication Consent: n/a (consult service)    Patient discussed with attending psychiatrist Dr. Malhotra, who was in agreement with A/P  Calin Platt MD (available via Epic Haiku)  On behalf of the Adult Psychiatry EPAT Service; tel. [phone]   "

## 2024-01-25 NOTE — PROGRESS NOTES
For full history, physical exam, and prior hospital course, please see previous ED provider note. This is in addition to the primary record.    Comments/Additional Findings: Patient was signed out to me by Dr Kaminski at change of shift.   Pending items at this time include EPAT to evaluate.  Patient medically cleared.  EPAT reevaluated patient and recommends placement.  Recommend giving him his Zyprexa 5 mg if willing to take it.     Pending placement patient was signed out to oncoming physician at shift change      Diagnoses as of 01/25/24 0540   Mood disorder (CMS/[facility])                    Note: This note was dictated by speech recognition. Minor errors in transcription may be present.

## 2024-01-25 NOTE — GROUP NOTE
Group Topic: Cognitive Focus   Group Date: 1/25/2024  Start Time: 1400  End Time: 1455  Facilitators: TRACI SalgueroS   Department: [facility] REHAB THERAPY VIRTUAL    Number of Participants: 9   Group Focus: clarity of thought, concentration, and social skills  Treatment Modality: Recreation Therapy  Interventions utilized were problem solving  Purpose: coping skills and communication skills    Name: Moris Conklin YOB: 1995   MR: [id number]      Facilitator: Recreational Therapist  Level of Participation: did not attend  Progress: None  Comments: Patients engaged in an intellectually enriching LINKEE session. This recreational pursuit honed participants cognitive agility and verbal associations while encouraging teamwork and collaboration. The strategic interplay of word connections in LINKEE served as a cognitive stimulant, amplifying both mental dexterity and interpersonal bonds, enhancing the overall therapeutic experience for participants involved. Patient declined invitation to group activity at this time. Patient will continue to be provided with opportunities to enhance leisure skills and/or coping mechanisms.  Plan: continue with services

## 2024-01-25 NOTE — NURSING NOTE
"Patient approached the nurses station requesting us to open the shower so that the patient can urinate. The patient spoke with medical doctor about enlarged prostate and how he is unable to urinate. Patient said to doctor that \"now that I have a roommate I will be unable to urinate\". Doctor ordered a bladder scan, I was unable to locate bladder and locate the bladder volume.    1900: Patient again approached nurses station stating \"I feel really sick, my hands are numb and I don't feel well\". He then stated \"if you let me in the shower I will feel better\". I again informed the patient that I am not opening the communal shower so that he can urinate in the shower that every patient uses to wash themselves. I said you can use the toilet in your room.  "

## 2024-01-25 NOTE — SIGNIFICANT EVENT
Application for Emergency Admission      Ready for Transfer?  Is the patient medically cleared for transfer to inpatient psychiatry: Yes  Has the patient been accepted to an inpatient psychiatric hospital: Yes    Application for Emergency Admission  IN ACCORDANCE WITH [place]10 [place]  The Chief Clinical Officer of: [facility] 1/25/2024 .11:36 AM    Reason for Hospitalization  The undersigned has reason to believe that: Moris Conklin Is a mentally ill person subject to hospitalization by court order under division B Section 5122.01 of the Revised Code, i.e., this person:    1.No  Represents a substantial risk of physical harm to self as manifested by evidence of threats of, or attempts at, suicide or serious self-inflicted bodily harm    2.No Represents a substantial risk of physical harm to others as manifested by evidence of recent homicidal or other violent behavior, evidence of recent threats that place another in reasonable fear of violent behavior and serious physical harm, or other evidence of present dangerousness    3.Yes Represents a substantial and immediate risk of serious physical impairment or injury to self as manifested by  evidence that the person is unable to provide for and is not providing for the person's basic physical needs because of the person's mental illness and that appropriate provision for those needs cannot be made  immediately available in the community    4.Yes Would benefit from treatment in a hospital for his mental illness and is in need of such treatment as manifested by evidence of behavior that creates a grave and imminent risk to substantial rights of others or  himself.    5.Yes Would benefit from treatment as manifested by evidence of behavior that indicates all of the following:       (a) The person is unlikely to survive safely in the community without supervision, based on a clinical determination.       (b) The person has a history of lack of  compliance with treatment for mental illness and one of the following applies:      (i) At least twice within the thirty-six months prior to the filing of an affidavit seeking court-ordered treatment of the person under section 5122.111 of the Revised Code, the lack of compliance has been a significant factor in necessitating hospitalization in a hospital or receipt of services in a forensic or other mental health unit of a correctional facility, provided that the thirty-six-month period shall be extended by the length of any hospitalization or incarceration of the person that occurred within the thirty-six-month period.      (ii) Within the forty-eight months prior to the filing of an affidavit seeking court-ordered treatment of the person under section 5122.111 of the Revised Code, the lack of compliance resulted in one or more acts of serious violent behavior toward self or others or threats of, or attempts at, serious physical harm to self or others, provided that the forty-eight-month period shall be extended by the length of any hospitalization or incarceration of the person that occurred within the forty-eight-month period.      (c) The person, as a result of mental illness, is unlikely to voluntarily participate in necessary treatment.       (d) In view of the person's treatment history and current behavior, the person is in need of treatment in order to prevent a relapse or deterioration that would be likely to result in substantial risk of serious harm to the person or others.    (e) Represents a substantial risk of physical harm to self or others if allowed to remain at liberty pending examination.    Therefore, it is requested that said person be admitted to the above named facility.    STATEMENT OF BELIEF    Must be filled out by one of the following: a psychiatrist, licensed physician, licensed clinical psychologist, health or ,  or .  (Statement shall include the  circumstances under which the individual was taken into custody and the reason for the person's belief that hospitalization is necessary. The statement shall also include a reference to efforts made to secure the individual's property at his residence if he was taken into custody there. Every reasonable and appropriate effort should be made to take this person into custody in the least conspicuous manner possible.)    Sent over from [facility].  Concern for acute psychosis/manic episode with signs of internal stimulation.    Dominique Trujillo MD 1/25/2024     _____________________________________________________________   Place of Employment: [employer]    STATEMENT OF OBSERVATION BY PSYCHIATRIST, LICENSED PHYSICIAN, OR LICENSED CLINICAL PSYCHOLOGIST, IF APPLICABLE    Place of Observation (e.g., [facility] mental health center, general hospital, office, emergency facility)  (If applicable, please complete)    Dominique Trujillo MD 1/25/2024    _____________________________________________________________

## 2024-01-25 NOTE — CARE PLAN
"The patient's goals for the shift include \"to leave appropriately\"    The clinical goals for the shift include maintain safety    Over the shift, the patient did not make progress toward the following goals. Barriers to progression include acuteness of illness. Recommendations to address these barriers include maintain Q 15 minute rounds to maintain safety.      Problem: Sensory Perceptual Alteration as Evidenced by  Goal: Cooperates with admission process  Outcome: Progressing     Problem: Sensory Perceptual Alteration as Evidenced by  Goal: Participates in unit activities  Outcome: Progressing     Problem: Defensive Coping  Goal: Discusses and identifies healthy coping skills  Outcome: Progressing       "

## 2024-01-25 NOTE — CARE PLAN
Care Plan initiated on this day. Pt will be encouraged to attend groups to meet set goals. 1:1s will be completed as requested.

## 2024-01-25 NOTE — ED PROVIDER NOTES
CC: Psychiatric Evaluation (Pt pink slipped to the ER for evaluation dt poor ADLs and being internally stimulated.  )     HPI:  28-year-old male presents emergency department for psychiatric evaluation.  Patient was seen by Jenn Grace today and was pink slipped.  Patient has not been caring for himself.  He appears internally stimulated and has been very focused on Rastafari beliefs.  Patient states he just needs help.  History difficult due to patient's acute psychiatric illness.  Patient appears internally stimulated on exam.    Records Reviewed:  Recent available ED and inpatient notes reviewed in EMR.    PMHx/PSHx:  Per HPI.   - has no past medical history on file.  - has no past surgical history on file.  - has ADHD (attention deficit hyperactivity disorder), combined type; Cannabis use, unspecified, uncomplicated; Depression with anxiety; Manic episode, unspecified (CMS/HCC); Mood disorder (CMS/HCC); Nicotine dependence, unspecified, uncomplicated; Tourette's syndrome; and Acute psychosis (CMS/HCC) on their problem list.    Medications:  Reviewed in EMR. See EMR for complete list of medications and doses.    Allergies:  Patient has no known allergies.    Social History:  - Tobacco:  reports that he has never smoked. He has never used smokeless tobacco.   - Alcohol:  has no history on file for alcohol use.   - Illicit Drugs:  has no history on file for drug use.     ROS:  Per HPI.       ???????????????????????????????????????????????????????????????  Triage Vitals:  T 36.4 °C (97.5 °F)  HR (!) 108  /82  RR 18  O2 100 %      Physical Exam  ???????????????????????????????????????????????????????????????  GEN: Disheveled  HEAD: atraumatic  EYES: PERRL, EOMI, no scleral icterus  CVS/CHEST: Mild tachycardia  PULM: CTA b/l no wheezes, crackles, or rhonchi   GI: soft, NT/ND, no rebound or guarding   NEURO: Awake and alert, normal ambulation  SKIN: warm, dry  PSYCH: Internally stimulated    Assessment and  Plan:  Given their concern for psychiatric presentation, labs were obtained to evaluate for any underlying infection, intoxication, toxidrome, or underlying electrolyte disturbance clouding patient’s presentation. CBC, electrolyte panel, UDS, blood drug screen were obtained. At this point patient is MEDICALLY CLEAR awaiting psychiatric evaluation.     Patient required no physical or chemical restraints, and was calm and cooperative.    ED Course:  Diagnoses as of 01/25/24 1622   Mood disorder (CMS/HCC)       Social Determinants Limiting Care:  Mental health issues    Disposition:  Signed out to oncoming physician pending EPAT evaluation    Audrey Kaminski, DO      Procedures ? SmartLinks last updated 1/25/2024 4:22 PM     Audrey Kaminski, DO  01/25/24 162

## 2024-01-25 NOTE — NURSING NOTE
"Patient was not out on the unit and not social with peers this shift. Rated anxiety 0/10 and depression 0/10. Denied SI/HI. Denied auditory/visual/other hallucinations. Patient complains of 10/10 pain in his groin area, patient states he has a hard time urinating. Patient has not attended group therapy this shift. Medication compliant. Able to state positive coping skills such as \"work, service and retail\". Patient has been cooperative this shift, he presents as agitated at times. Later in the shift patient was more pleasant and calm. Q15 minute checks to be maintained throughout shift for safety.    "

## 2024-01-26 LAB
APPEARANCE UR: CLEAR
BILIRUB UR STRIP.AUTO-MCNC: NEGATIVE MG/DL
COLOR UR: YELLOW
CRP SERPL-MCNC: <0.1 MG/DL
EST. AVERAGE GLUCOSE BLD GHB EST-MCNC: 91 MG/DL
GLUCOSE UR STRIP.AUTO-MCNC: NEGATIVE MG/DL
HBA1C MFR BLD: 4.8 %
HIV 1+2 AB+HIV1 P24 AG SERPL QL IA: NONREACTIVE
KETONES UR STRIP.AUTO-MCNC: ABNORMAL MG/DL
LEUKOCYTE ESTERASE UR QL STRIP.AUTO: NEGATIVE
MUCOUS THREADS #/AREA URNS AUTO: NORMAL /LPF
NITRITE UR QL STRIP.AUTO: NEGATIVE
PH UR STRIP.AUTO: 6 [PH]
PROT UR STRIP.AUTO-MCNC: NEGATIVE MG/DL
RBC # UR STRIP.AUTO: ABNORMAL /UL
RBC #/AREA URNS AUTO: NORMAL /HPF
SP GR UR STRIP.AUTO: 1.02
TSH SERPL-ACNC: 0.86 MIU/L (ref 0.44–3.98)
UROBILINOGEN UR STRIP.AUTO-MCNC: <2 MG/DL
WBC #/AREA URNS AUTO: NORMAL /HPF

## 2024-01-26 PROCEDURE — 2500000004 HC RX 250 GENERAL PHARMACY W/ HCPCS (ALT 636 FOR OP/ED): Performed by: INTERNAL MEDICINE

## 2024-01-26 PROCEDURE — 2500000004 HC RX 250 GENERAL PHARMACY W/ HCPCS (ALT 636 FOR OP/ED): Performed by: NURSE PRACTITIONER

## 2024-01-26 PROCEDURE — 97165 OT EVAL LOW COMPLEX 30 MIN: CPT | Mod: GO

## 2024-01-26 PROCEDURE — 1240000001 HC SEMI-PRIVATE BH ROOM DAILY

## 2024-01-26 PROCEDURE — 81003 URINALYSIS AUTO W/O SCOPE: CPT | Performed by: NURSE PRACTITIONER

## 2024-01-26 PROCEDURE — 97150 GROUP THERAPEUTIC PROCEDURES: CPT | Mod: GO,CO

## 2024-01-26 PROCEDURE — 99223 1ST HOSP IP/OBS HIGH 75: CPT | Performed by: NURSE PRACTITIONER

## 2024-01-26 PROCEDURE — 2500000001 HC RX 250 WO HCPCS SELF ADMINISTERED DRUGS (ALT 637 FOR MEDICARE OP): Performed by: NURSE PRACTITIONER

## 2024-01-26 RX ORDER — FLUOXETINE HYDROCHLORIDE 20 MG/1
20 CAPSULE ORAL ONCE
Status: COMPLETED | OUTPATIENT
Start: 2024-01-27 | End: 2024-01-27

## 2024-01-26 RX ORDER — LORAZEPAM 0.5 MG/1
0.5 TABLET ORAL EVERY 8 HOURS PRN
Status: DISCONTINUED | OUTPATIENT
Start: 2024-01-26 | End: 2024-02-02 | Stop reason: HOSPADM

## 2024-01-26 RX ORDER — OLANZAPINE 5 MG/1
5 TABLET ORAL ONCE
Status: COMPLETED | OUTPATIENT
Start: 2024-01-26 | End: 2024-01-26

## 2024-01-26 RX ORDER — FLUOXETINE 10 MG/1
10 CAPSULE ORAL DAILY
Status: DISCONTINUED | OUTPATIENT
Start: 2024-01-26 | End: 2024-01-27

## 2024-01-26 RX ORDER — FLUOXETINE HYDROCHLORIDE 20 MG/1
40 CAPSULE ORAL DAILY
Status: DISCONTINUED | OUTPATIENT
Start: 2024-01-29 | End: 2024-02-02 | Stop reason: HOSPADM

## 2024-01-26 RX ORDER — OLANZAPINE 5 MG/1
10 TABLET ORAL NIGHTLY
Status: DISCONTINUED | OUTPATIENT
Start: 2024-01-28 | End: 2024-01-30

## 2024-01-26 RX ADMIN — TAMSULOSIN HYDROCHLORIDE 0.4 MG: 0.4 CAPSULE ORAL at 08:57

## 2024-01-26 RX ADMIN — OLANZAPINE 5 MG: 5 TABLET, FILM COATED ORAL at 21:23

## 2024-01-26 RX ADMIN — FLUOXETINE 10 MG: 10 CAPSULE ORAL at 15:12

## 2024-01-26 SDOH — SOCIAL STABILITY: SOCIAL INSECURITY: WERE YOU ABLE TO COMPLETE ALL THE BEHAVIORAL HEALTH SCREENINGS?: YES

## 2024-01-26 SDOH — SOCIAL STABILITY: SOCIAL INSECURITY: DOES ANYONE TRY TO KEEP YOU FROM HAVING/CONTACTING OTHER FRIENDS OR DOING THINGS OUTSIDE YOUR HOME?: NO

## 2024-01-26 SDOH — SOCIAL STABILITY: SOCIAL INSECURITY: DO YOU FEEL UNSAFE GOING BACK TO THE PLACE WHERE YOU ARE LIVING?: NO

## 2024-01-26 SDOH — SOCIAL STABILITY: SOCIAL INSECURITY: ARE YOU OR HAVE YOU BEEN THREATENED OR ABUSED PHYSICALLY, EMOTIONALLY, OR SEXUALLY BY ANYONE?: NO

## 2024-01-26 SDOH — SOCIAL STABILITY: SOCIAL INSECURITY: HAS ANYONE EVER THREATENED TO HURT YOUR FAMILY OR YOUR PETS?: NO

## 2024-01-26 SDOH — SOCIAL STABILITY: SOCIAL INSECURITY: ARE THERE ANY APPARENT SIGNS OF INJURIES/BEHAVIORS THAT COULD BE RELATED TO ABUSE/NEGLECT?: NO

## 2024-01-26 SDOH — SOCIAL STABILITY: SOCIAL INSECURITY: ABUSE: ADULT

## 2024-01-26 SDOH — SOCIAL STABILITY: SOCIAL INSECURITY: DO YOU FEEL ANYONE HAS EXPLOITED OR TAKEN ADVANTAGE OF YOU FINANCIALLY OR OF YOUR PERSONAL PROPERTY?: NO

## 2024-01-26 ASSESSMENT — LIFESTYLE VARIABLES
HOW OFTEN DO YOU HAVE 6 OR MORE DRINKS ON ONE OCCASION: NEVER
SKIP TO QUESTIONS 9-10: 1
AUDIT-C TOTAL SCORE: 0
HOW MANY STANDARD DRINKS CONTAINING ALCOHOL DO YOU HAVE ON A TYPICAL DAY: PATIENT DOES NOT DRINK
AUDIT-C TOTAL SCORE: 0
HOW OFTEN DO YOU HAVE A DRINK CONTAINING ALCOHOL: NEVER
SUBSTANCE_ABUSE_PAST_12_MONTHS: YES
PRESCIPTION_ABUSE_PAST_12_MONTHS: NO

## 2024-01-26 ASSESSMENT — ENCOUNTER SYMPTOMS
DIFFICULTY URINATING: 1
APPETITE CHANGE: 1
RECTAL PAIN: 1
ABDOMINAL PAIN: 1

## 2024-01-26 ASSESSMENT — PAIN - FUNCTIONAL ASSESSMENT
PAIN_FUNCTIONAL_ASSESSMENT: 0-10
PAIN_FUNCTIONAL_ASSESSMENT: 0-10

## 2024-01-26 ASSESSMENT — PAIN SCALES - GENERAL
PAINLEVEL_OUTOF10: 0 - NO PAIN
PAINLEVEL_OUTOF10: 0 - NO PAIN

## 2024-01-26 ASSESSMENT — COLUMBIA-SUICIDE SEVERITY RATING SCALE - C-SSRS
2. HAVE YOU ACTUALLY HAD ANY THOUGHTS OF KILLING YOURSELF?: NO
6. HAVE YOU EVER DONE ANYTHING, STARTED TO DO ANYTHING, OR PREPARED TO DO ANYTHING TO END YOUR LIFE?: NO
1. SINCE LAST CONTACT, HAVE YOU WISHED YOU WERE DEAD OR WISHED YOU COULD GO TO SLEEP AND NOT WAKE UP?: NO

## 2024-01-26 ASSESSMENT — ACTIVITIES OF DAILY LIVING (ADL): LACK_OF_TRANSPORTATION: NO

## 2024-01-26 NOTE — SIGNIFICANT EVENT
01/26/24 1130   Able to Complete Psychiatric Screening   Were you able to complete all the behavioral health screenings? Yes   Abuse Screen   Abuse Screen Adult   Are you or have you been threatened or abused physically, emotionally, or sexually by anyone? No   Has anyone ever threatened to hurt your family or your pets? No   Does anyone try to keep you from having/contacting other friends or doing things outside your home? No   Do you feel UNSAFE going back to the place where you are living? No   Do you feel anyone has exploited or taken advantage of you financially or of your personal property? No   Are there any apparent signs of injuries/behaviors that could be related to abuse/neglect? No   Trauma/Abuse Assessment   Physical Abuse Denies   Verbal Abuse Denies   Drug Screening   Have you used any substances (canabis, cocaine, heroin, hallucinogens, inhalants, etc.) in the past 12 months? Yes   Have you used any prescription drugs other than prescribed in the past 12 months? No   Is a toxicology screen needed? Yes   Audit Alcohol Screening   Q1: How often do you have a drink containing alcohol? Never   Q2: How many drinks containing alcohol do you have on a typical day when you are drinking? None   Q3: How often do you have six or more drinks on one occasion? Never   Audit-C Score 0   Patient Strengths/Barriers   Strengths (Must Choose Two) Support from family;Stable housing   Barriers Motivation level for treatment;Support from friends   Consults    Consult Needed Yes (Comment)   Spiritual Care Consult Needed No       (Per Psychiatry Consult:        Calin Platt MD   Fellow  Psychiatry     Consults      Attested     Date of Service: 1/24/2024 10:08 PM     Attested       Expand All Collapse All    BEHAVIORAL HEALTH INITIAL CONSULTATION NOTE     Referring Provider: Yamilex     Consultation information:  Consults   Visit type: Virtual evaluation     HISTORY OF PRESENT ILLNESS:  Moris  Take antibiotic as prescribed.     Take probiotic once per day 2 hours after antibiotic.      Tylenol as needed.     Rest and hydrate     Return if symptoms fail to improve    "Katerin is a 28 y.o. male, hx of Bipolar I disorder (managed most recently by F on Zyprexa 5mg PO daily but lost to followup 2/2 payment issues for ~1.5 years), pink slipped from [facility] to [facility] ED for c/o depression with neurovegetative sx, with the psychiatry CL service consulting thus.     Per ambulance record (no triage note nor ED note for this encounter), [facility] therapist called ambulance after patient was noted to be socially withdrawing, not caring for self, and increasing non-psychiatric complaints re: urination difficulty and hemorrhoids. Historically seen by [facility] psychiatrist Julieth Fitzpatrick but lost to follow up since 6/16/22 after insurance concerns.     Patient evaluated virtually. \"I'm very sick. Mentally I'm fine. I'm here for a misinterpretation. It was a provoked outburst... the last person told me I'm fine then they showed up at my door... of course I'm not perfect because I'm sick but I'm fine... I'm on the road to recovery... from groin pains.\" Re: bipolar: \"it's happenstance... circumstance... I have to just diet and eat well and take medical medications... not psychiatric\" and confirmed no current psychopharm. Noted extreme goal-directed behavior; \"not even time to go to the doctor... I go to work! I work a lot. 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 then I do important hobbies... to stimulate my mind, body and soul... I do MMA, music, entertainment, and nicole.\" So also goal-directed that way. When asked about sleep was guarded... \"I go to sleep pretty late and wake up at 4 for work... but only work 4 hours...\" Denied substance use. When asked about self care was also guarded, \"I'm on the road to doing that!\" Then perseverated on \"prescriptions... hemorrhoidal ointment and I'm going keto.\" No current psychiatrist but still therapy. Denied past SA nor inpatient psych visits. Denied SI/HI/AVH currently. Re: past meds: \"I was on zyprexa for 6 months but I only " "took it 2 months; it didn't help but they said I just didn't take it right.\"     Edd [phone] (mother; patient provided verbal consent for collateral)  \"He's doing bad... he can't pee, he has chest pain, headache, and hemorrhoids. Emotionally, I don't know where to begin... not showering, not brushing teeth, not eating right, not sleeping, he games a lot... talking about things that aren't there like Jain.\" She confirmed goal directed behavior and no sleep. Re psychotic features, \"he's having bad thoughts about his stepfather... he goes on rants at 3-6 AM, not showering, not seeing a doctor even though I try. He's been through [facility] and [facility].\" Never inpatient nor SA. She stated she is actually working on potentially gaining partial court appointed medical decision-maker.     Past Psychiatric History  Current/Previous Diagnoses:  Bipolar disorder by history  Current Psychiatrist/Provider:  None but most recently [facility]  Current Therapist:  Abdirashid  Other Providers / Agencies: Denies  Outpatient Treatment History:  as above  Past Medication Trials:  Zoloft (unclear), zyprexa (noncompliant)  Inpatient Hospitalizations: Denies  Suicide Attempts: Denies  Homicide attempts/Violence: Denies  Self Harm/Self Injurious: Denies     Substance Abuse History  Tobacco use history: Denies  Alcohol use history: Denies  Cannabis use history:  hx; not current per patient but positive tox  Illicit Drug Use History: Denies     Social History  He has no history on file for tobacco use, alcohol use, and drug use.  Household: lives with mother, stepfather.  Occupation:  for food delivery  Hobbies/interests/coping: many but unclear  Legal hx: Denies  History of trauma/abuse: Denies however high index of suspicion given multiple  complaints and vague psychotic vs dissociative sx  Weapons at home and access to lethal means: Denies     OARRS REVIEW  OARRS checked: No data recorded   OARRS comments: No disps     Past Medical " "History  He has no past medical history on file.  Hemorrhoids, urinary dysfunction     ALLERGIES  Patient has no known allergies.     Surgical History  He has no past surgical history on file.     FAMILY HISTORY  Family History   No family history on file.         PSYCHIATRIC REVIEW OF SYSTEMS  Depression: markedly diminished interest or pleasure in all or most activities and psychomotor agitation or retardation  Anxiety: excessive worry that is difficult to control, irritability, muscle tension, and sleep disturbance  Michelle: expansive or irritable mood , increased goal-directed activity , inflated self-esteem or grandiosity , decreased need for sleep, talkativeness or pressured speech, and flight of ideas or racing thoughts  Psychosis: delusions: grandiosity, disorganized speech, and disorganized thought   Delirium: negative   Trauma: negative     OBJECTIVE     VITALS       1/24/2024     5:16 PM   Vitals   Systolic 116   Diastolic 82   Heart Rate 108   Temp 36.4 °C (97.5 °F)   Resp 18      There is no height or weight on file to calculate BMI.  No height and weight on file for this encounter.  Wt Readings from Last 4 Encounters:   No data found for Wt         Mental Status Exam  General: NAD W M seated comfortably during interview.  Appearance: Appeared as age stated; appropriately dressed/groomed.  Attitude: Guarded and superficially cooperative  Behavior: Fair EC; overall responding appropriately  Motor Activity: No notable ciro PMAR  Speech:  Rapid and somewhat pressured  Mood: \"I want to leave\"  Affect: Increased range and intensity; angry; at times labile and inappropriate  Thought Process: Perseverating at tangential  Thought Content: Denied SI/HI. Voiced delusions per above.  Thought Perception: Did not appear to be responding to internal stimuli. Not endorsing AVH  Cognition: Grossly intact; A&O x4/4 to self, place, date, and context.  Insight: Limited  Judgement: Poor     HOME MEDICATIONS  Medication " Documentation Review Audit    **Prior to Admission medications have not yet been reviewed**            CURRENT MEDICATIONS  Scheduled medications        Continuous medications        PRN medications        LABS          Admission on 01/24/2024   Component Date Value Ref Range Status    WBC 01/24/2024 10.3  4.4 - 11.3 x10*3/uL Final    nRBC 01/24/2024 0.0  0.0 - 0.0 /100 WBCs Final    RBC 01/24/2024 4.53  4.50 - 5.90 x10*6/uL Final    Hemoglobin 01/24/2024 14.0  13.5 - 17.5 g/dL Final    Hematocrit 01/24/2024 41.7  41.0 - 52.0 % Final    MCV 01/24/2024 92  80 - 100 fL Final    MCH 01/24/2024 30.9  26.0 - 34.0 pg Final    MCHC 01/24/2024 33.6  32.0 - 36.0 g/dL Final    RDW 01/24/2024 12.5  11.5 - 14.5 % Final    Platelets 01/24/2024 234  150 - 450 x10*3/uL Final    Neutrophils % 01/24/2024 74.4  40.0 - 80.0 % Final    Immature Granulocytes %, Automated 01/24/2024 0.5  0.0 - 0.9 % Final     Immature Granulocyte Count (IG) includes promyelocytes, myelocytes and metamyelocytes but does not include bands. Percent differential counts (%) should be interpreted in the context of the absolute cell counts (cells/UL).    Lymphocytes % 01/24/2024 15.8  13.0 - 44.0 % Final    Monocytes % 01/24/2024 7.5  2.0 - 10.0 % Final    Eosinophils % 01/24/2024 1.2  0.0 - 6.0 % Final    Basophils % 01/24/2024 0.6  0.0 - 2.0 % Final    Neutrophils Absolute 01/24/2024 7.68  1.20 - 7.70 x10*3/uL Final     Percent differential counts (%) should be interpreted in the context of the absolute cell counts (cells/uL).    Immature Granulocytes Absolute, Au* 01/24/2024 0.05  0.00 - 0.70 x10*3/uL Final    Lymphocytes Absolute 01/24/2024 1.63  1.20 - 4.80 x10*3/uL Final    Monocytes Absolute 01/24/2024 0.77  0.10 - 1.00 x10*3/uL Final    Eosinophils Absolute 01/24/2024 0.12  0.00 - 0.70 x10*3/uL Final    Basophils Absolute 01/24/2024 0.06  0.00 - 0.10 x10*3/uL Final    Glucose 01/24/2024 101 (H)  74 - 99 mg/dL Final    Sodium 01/24/2024 139  136 - 145  mmol/L Final    Potassium 01/24/2024 4.0  3.5 - 5.3 mmol/L Final    Chloride 01/24/2024 103  98 - 107 mmol/L Final    Bicarbonate 01/24/2024 28  21 - 32 mmol/L Final    Anion Gap 01/24/2024 12  10 - 20 mmol/L Final    Urea Nitrogen 01/24/2024 15  6 - 23 mg/dL Final    Creatinine 01/24/2024 1.03  0.50 - 1.30 mg/dL Final    eGFR 01/24/2024 >90  >60 mL/min/1.73m*2 Final     Calculations of estimated GFR are performed using the 2021 CKD-EPI Study Refit equation without the race variable for the IDMS-Traceable creatinine methods.  https://jasn.asnjournals.org/content/early/2021/09/22/ASN.8490649550    Calcium 01/24/2024 9.5  8.6 - 10.3 mg/dL Final    Albumin 01/24/2024 4.6  3.4 - 5.0 g/dL Final    Alkaline Phosphatase 01/24/2024 70  33 - 120 U/L Final    Total Protein 01/24/2024 6.9  6.4 - 8.2 g/dL Final    AST 01/24/2024 17  9 - 39 U/L Final    Bilirubin, Total 01/24/2024 0.4  0.0 - 1.2 mg/dL Final    ALT 01/24/2024 22  10 - 52 U/L Final     Patients treated with Sulfasalazine may generate falsely decreased results for ALT.    Amphetamine Screen, Urine 01/24/2024 Presumptive Negative  Presumptive Negative Final     CUTOFF LEVEL: 500 NG/ML   Cross-reactivity has been reported with high concentrations   of the following drugs: buproprion, chloroquine, chlorpromazine,   ephedrine, mephentermine, fenfluramine, phentermine,   phenylpropanolamine, pseudoephedrine, and propranolol.    Barbiturate Screen, Urine 01/24/2024 Presumptive Negative  Presumptive Negative Final     CUTOFF LEVEL: 200 NG/ML    Benzodiazepines Screen, Urine 01/24/2024 Presumptive Negative  Presumptive Negative Final     CUTOFF LEVEL: 200 NG/ML    Cannabinoid Screen, Urine 01/24/2024 Presumptive Positive (A)  Presumptive Negative Final     CUTOFF LEVEL: 50 NG/ML    Cocaine Metabolite Screen, Urine 01/24/2024 Presumptive Negative  Presumptive Negative Final     CUTOFF LEVEL: 150 NG/ML    Fentanyl Screen, Urine 01/24/2024 Presumptive Negative  Presumptive  Negative Final     CUTOFF LEVEL: 5 NG/ML    Opiate Screen, Urine 01/24/2024 Presumptive Negative  Presumptive Negative Final     CUTOFF LEVEL: 300 NG/ML  The opiate screen does not detect fentanyl, meperidine, or   tramadol. Oxycodone is not consistently detected (refer to  Oxycodone Screen, Urine result).    Oxycodone Screen, Urine 01/24/2024 Presumptive Negative  Presumptive Negative Final     CUTOFF LEVEL: 100 NG/ML  This test will accurately detect both oxycodone and oxymorphone.    PCP Screen, Urine 01/24/2024 Presumptive Negative  Presumptive Negative Final     CUTOFF LEVEL:  25 NG/ML  Cross-reactivity has been reported with dextromethorphan.    Acetaminophen 01/24/2024 <10.0  10.0 - 30.0 ug/mL Final    Salicylate  01/24/2024 <3  4 - 20 mg/dL Final    Alcohol 01/24/2024 <10  <=10 mg/dL Final    Coronavirus 2019, PCR 01/24/2024 Not Detected  Not Detected Final         IMAGING  None     PSYCHIATRIC RISK ASSESSMENT  Violence Risk Factors:  current psychiatric illness, persecutory delusions, and lack of insight  Acute Risk of Harm to Others is Considered: Low  Suicide Risk Factors: male, ; /[place] native, chronic medical illness, chronic pain, current psychiatric illness, life crisis (shame/despair), global insomnia, mood congruent delusions, severe anxiety, akathisia, or panic, anxious ruminations, lack of treatment access, discontinuities in treatment, or recent discharge from hospital, and nonadherence to medication treatment for psychosis   Protective Factors: sense of responsibility towards family  Acute Risk of Harm to Self is Considered: High     Assessment/Plan   Active Problems:  There are no active Hospital Problems.           Assessment:  Moris Conklin is a 28 y.o. male, hx of Bipolar I disorder (managed most recently by [facility] on Zyprexa 5mg PO daily but lost to followup 2/2 payment issues for ~1.5 years), pink slipped from [facility] to [facility] for c/o depression with  "neurovegetative sx, with the psychiatry CL service consulting thus.     Based on above risk and protective factors, patient appears to be a chronically Low risk to self and Low risk to others, and with acute elevation to risk self per above, but without apparent acute elevation in risk to others.     Patient presenting with notable goal directed behavior, labile mood, decreased need for sleep, disorganized thought/speech, and worsening care for self and cor his chronic medical problems. While acute on chronic kathe/bipolar disorder as previously diagnosed is very much at the top of differential, given urogenital and rectal preoccupation re: chronic pain, as well as nonspecific disorganized (manic vs psychotic vs dissociative) concerns, an un-shared significant trauma-related component could also be contributing. In any case, patient presenting with ciro decrease in self-care and increased risk. As such, given the patient's acute elevation in risk that appears attributable to apparent exacerbation of underlying psychiatric conditions (bipolar disorder), the patient appears at this time to require inpatient psychiatric level of care for acute safety and stabilization, and this appears certainly the least restrictive setting for this admission. Patient is thus recommended for inpatient psychiatric level of care. Patient offered potential consent process for Olanzapine resumption and expressly declined this.     Impression:  - Bipolar disorder  - Consider other specified trauma-related or psychotic component     Recs:  - Patient MEETS criteria for inpatient psychiatric admission per above  - [facility]  - Please do not issue involuntary committal (pink slip) until notified by [facility] that an inpatient bed has been secured.    -- To place an involuntary order (\"pink slip\") in EPIC, search \"Application for Emergency Admission\" under SmartText.\"  - " "Patient may not leave AMA, call [facility]IOLET if patient attempts to elope.   - Please utilize capacity tool note template in EPIC on case-by-case basis   -- To utilize EPIC capacity tool, search \" IP CAPACITY EVALUATION\" under SmartText unless the patient has a legal guardian, in which case all decisions per the legal guardian. Psychiatry is always available by phone to provide assistance in using this tool.   -- As a reminder, capacity applies to one decision at a given time. If patient's capacity is assessed for more than one decision, or on more than one occasion, these should be documented in separate notes.   - Patient should be in hospital attire. Please remove/secure personal belongings from the room.  - Continue 1:1 sitter from a psychiatric perspective at this time.  - Medications: would continue to offer consent process for Zyprexa 5mg PO daily  - Pharmacologic PRN recommendations:    -- Please utilize the agitation order set in Epic on a case-by-case basis  - Please page the psychiatry CL team ([phone]) if additional questions arise  - Above recs communicated with primary team         I spent 60 minutes in the professional and overall care of this patient.        Medication Consent: n/a (consult service)     Patient discussed with attending psychiatrist Dr. Malhotra, who was in agreement with A/P  Calin Platt MD (available via Epic Haiku)  On behalf of the Adult Psychiatry EPAT Service; tel. [phone]             Attestation signed by Triny Malhotra MD at 1/25/2024  6:29 AM     I reviewed the resident/fellow's documentation and discussed the patient with the resident/fellow. I agree with the resident/fellow's medical decision making as documented in the note.                   Cosigned by: Triny Malhotra MD at 1/25/2024  6:29 AM   Electronically signed by Calin Platt MD at 1/24/2024 11:05 PM  Electronically signed by Calin Platt MD at 1/24/2024 11:06 PM  Electronically " "signed by Triny Malhotra MD at 1/25/2024  6:29 AM         ED on 1/24/2024            Revision History          Note shared with patient  Clinical Impressions      Mood disorder (CMS/HCC)  Disposition      Transfer to Another Facility  Condition: Stable      AVS (Automatic SnapShot taken 1/25/2024)  Care Timeline    01/24     1707   Arrived   1810   Comprehensive Metabolic Panel      Acute Toxicology Panel, Blood     Coronavirus 2019, Screen Asymptomatic     Influenza A, and B PCR     CBC and Auto Differential   2008   Drug Screen, Urine    2146   Electrocardiogram, 12-lead   01/25     1231   Discharged     End of Psychiatry Consult Note).    This is a 28 year old single white male, heterosexual, never , no children; he was admitted for decline in functioning (not bathing, \"living in filth\" per mom, not eating, showing paranoia (blacking out windows in his room, reports that his step dad is the \"devil\" and the \"antichrist\" and that he \"has the zuri of the devil on him\";  admitted to past history of ADHD and Tourette's; presents as guarded: denies abuse but when asked: \" none work noting\", is his response;  He agreed to sign an SALMA for his biological mother, Edd and he also signed the Application for Voluntary Admission;  he stated he is close to his mother, parents  when he was an infant, step father raised him, but has had regular visitation with his biological father;  admitted he used cannabis a few days before admit, but stated he is not planning to use it again.  He has multiple medical issues: urination difficulties and hemorrhoids;  He is focussed on his medical problems, stated he is here to get help with those, minimizing his psychiatric symptoms during interview;  has outpatient community mental health services through [facility]; stated he was inpatient on a psychiatry unit for stabilization, as a minor, cannot recall, thinks it was [facility], but unsure;  " He presents as guarded, but cooperative;  sw to follow.

## 2024-01-26 NOTE — SIGNIFICANT EVENT
"   01/26/24 1630   Discharge Planning   Living Arrangements Parent;Family members   Support Systems Parent;Family members   Type of Residence Private residence   Who is requesting discharge planning? Provider   Patient expects to be discharged to: back home with his mother and step father.   Housing Stability   In the last 12 months, was there a time when you were not able to pay the mortgage or rent on time? N   In the last 12 months, how many places have you lived? 1   In the last 12 months, was there a time when you did not have a steady place to sleep or slept in a shelter (including now)? N   Transportation Needs   In the past 12 months, has lack of transportation kept you from medical appointments or from getting medications? no   In the past 12 months, has lack of transportation kept you from meetings, work, or from getting things needed for daily living? No   Patient Choice   Patient / Family choosing to utilize agency / facility established prior to hospitalization Yes     This is a 28 year old single white male, heterosexual, never , no children; he was admitted for decline in functioning (not bathing, \"living in filth\" per mom, not eating, showing paranoia (blacking out windows in his room, reports that his step dad is the \"devil\" and the \"antichrist\" and that he \"has the zuri of the devil on him\";  admitted to past history of ADHD and Tourette's; presents as guarded: denies abuse but when asked: \" none work noting\", is his response;  He agreed to sign an SALMA for his biological mother, Edd and he also signed the Application for Voluntary Admission;  he stated he is close to his mother, parents  when he was an infant, step father raised him, but has had regular visitation with his biological father;  admitted he used cannabis a few days before admit, but stated he is not planning to use it again.  He has multiple medical issues: urination difficulties and hemorrhoids;  He is focussed on his " medical problems, stated he is here to get help with those, minimizing his psychiatric symptoms during interview;  has outpatient community mental health services through [place]; stated he was inpatient on a psychiatry unit for stabilization, as a minor, cannot recall, thinks it was [facility], but unsure;  He presents as guarded, but cooperative;  sw to follow.

## 2024-01-26 NOTE — PROGRESS NOTES
"Occupational Therapy     REHAB Therapy Assessment & Treatment    Patient Name: Moris Conklin  MRN: [id number]  Today's Date: 1/26/2024  Time 11:00-11:08    Activity Assessment:  Initial Assessment  Attention Span: 5-15 Minutes  Cognitive Behavior Status/Orientation: Oriented to:, Person, Place, Time  Emotional Concerns/Mood/Affect: Avoidant, Cooperative  Additional Comments: Pt agreeable to OT eval, pt sitting in common area waiting for grop sessions to start    Performance and Participation in Areas of Occupation:   Activities of Daily Living/Self Care:  · Self-Care Tasks: Independent. Reports urinary issues, related to his prostate. States he has difficulty urinating at times, but is independent.  · Medication Use: Pt reports recent compliance with medications related to urinary issues. Otherwise, does not take medication.     Instrumental Activities of Daily Living:  · Driving/Community Mobility: Independent    Education and Employment:  · Current Employment: Pt reports full time employment    Performance in Meaningful Roles:  · Life Roles/Goals: Personal goals of \"being well\" unable to elaborate what that entails in his perspective.   · Sense of Purposefulness  · Meaningful Roles Comment: Pt describes a lack of productive daily routine outside of working.     Emotional Regulation Skills:  · Emotional Expression: Pt dismissive of his  health needs, and states \"I never express my thoughts correctly\"  · Mood Modulation: Pleasant and cooperative, somewhat flat.   · Anxiety Management: Denies   · Depression Management: Denies     Client Factors:  · Sense of Safety/Security: Pt reports he will be returning to living with his step-dad following this admission. States \"It's not the best, but it's fine\"  · Coping Skills: Unable to ID productive, positive coping skills. Would benefit from exploration and education on application of skills into his daily routine.       Encounter Problems       Encounter Problems " (Active)       OT Goals       Pt will ID 1-2 LTG and 2-3 STG, including methods to achieve goals upon discharge (Progressing)       Start:  01/26/24    Expected End:  02/23/24            Pt will explore and ID 1-2 strategies to manage stressors/symptoms of illness/ grief more effectively prior to discharge.   (Progressing)       Start:  01/26/24    Expected End:  02/23/24            Pt will explore and ID 1-2 effective methods of expressing feelings, want and needs prior to discharge.   (Progressing)       Start:  01/26/24    Expected End:  02/23/24            Pt will ID 2-3 effective ways to distract from crisis and ID stressors/ personal symptoms of stress in order to improve management of stress during daily activities.   (Progressing)       Start:  01/26/24    Expected End:  02/23/24            Pt will appropriately participate in group and 1:1 sessions, 90% of the time (Progressing)       Start:  01/26/24    Expected End:  02/23/24                   Additional Comments: Pt agreeable to OT POC for attendance of 5x/week group sessions and/ or 1:1 sessions to address the goals established above prior to discharge.

## 2024-01-26 NOTE — CARE PLAN
"Patient out on the unit most of shift, withdrawn to self. Denied anxiety and depression. Denied SI/HI. Denied auditory/visual/other hallucinations. No complaints of pain or discomfort. Medication compliant. Able to state positive coping skills such as \"just sitting her\". The patient's goals for the shift include \"to get healthy hopefully soon\". Q15 minute checks to be maintained throughout shift for safety.       "
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
## 2024-01-26 NOTE — PROGRESS NOTES
Occupational Therapy     REHAB Therapy Assessment & Treatment    Patient Name: Moris Conklin  MRN: [id number]  Today's Date: 1/26/2024      Activity Assessment:     Self-Discovery/ Personal Understanding Group: [id number]  Let Go/Hold On Group: [id number]  Cognitive Task/ Team Collaboration Group: [id number]  Motivational Collage/ Relaxation Group: [id number]    4/4 Groups attended     Following OT eval and in collaboration with the OTR/L, pt present and partially participates in all above groups however, does present as guarded, flat and hesitant. With increased time and encouragement, pt willing to complete tasks asked of him. Pt is not social with peers. Pt willing to complete reflection handout following education on the topic however, declines to engage in group discussion. Pt shares (in written form) “I value work/truth, I spend my spare time smoking, my main goals are my health, I have no supports, and my greatest achievements are self-fulfillment” Pt declines to share same aloud. During cog task group, pt with some improved tolerable behaviors sharing his thoughts for his team with X1 instance of smiling. Pt later engages in collage creation with appropriate completion and eagerness to engage in. Pt again not social with peers however, much improved mood observed as compared to earlier groups. Pt with gradual progress this date as evidenced above. Pt would benefit from continued OT services in order to improve overall self-esteem, personal confidence and supports with increased awareness for safe transition location at discharge.              Encounter Problems       Encounter Problems (Active)       OT Goals       Pt will ID 1-2 LTG and 2-3 STG, including methods to achieve goals upon discharge (Progressing)       Start:  01/26/24    Expected End:  02/23/24            Pt will explore and ID 1-2 strategies to manage stressors/symptoms of illness/ grief more effectively prior to discharge.   (Progressing)        Start:  01/26/24    Expected End:  02/23/24            Pt will explore and ID 1-2 effective methods of expressing feelings, want and needs prior to discharge.   (Progressing)       Start:  01/26/24    Expected End:  02/23/24            Pt will ID 2-3 effective ways to distract from crisis and ID stressors/ personal symptoms of stress in order to improve management of stress during daily activities.   (Progressing)       Start:  01/26/24    Expected End:  02/23/24            Pt will appropriately participate in group and 1:1 sessions, 90% of the time (Progressing)       Start:  01/26/24    Expected End:  02/23/24                       Additional Comments:    ORELLANA collaborated with patients nurse and charge nurse throughout the day to provide the appropriate support and encouragement to attend groups. Pt up on unit when ORELLANA left last group of the day. All needs met.

## 2024-01-26 NOTE — CARE PLAN
The patient's goals for the shift include Pt will not require crisis meds    The clinical goals for the shift include Pt will remain calm and cooperative    Over the shift, the patient did  make progress toward the following goals    Problem: Psychosocial Needs  Goal: Demonstrates ability to cope with hospitalization/illness  Outcome: Not Progressing     Problem: Safety  Goal: Patient will be injury free during hospitalization  Outcome: Progressing  Goal: I will remain free of falls  Outcome: Progressing     Problem: Sensory Perceptual Alteration as Evidenced by  Goal: Understands least restrictive measures  Outcome: Progressing  Goal: Free from restraint events  Outcome: Progressing     Problem: Potential for Harm to Self or Others  Goal: Notifies staff when experiencing harmful thoughts toward self/others  Outcome: Progressing  Goal: Denies harm toward self or others  Outcome: Progressing  Goal: Free from restraint events  Outcome: Progressing     Problem: Ineffective Coping  Goal: Free from restraint events  Outcome: Progressing     Problem: Alteration in Sleep  Goal: STG - Informs staff if unable to sleep  Outcome: Progressing

## 2024-01-26 NOTE — NURSING NOTE
"Patient is in the lounge sitting at a table facing the TV, with his legs/feet up on top the table asking staff repeatedly for a cup of warm water so he can use it to urinate. Pt was instructed that his plan of care for both shifts includes that he will use warm water from his sink in his room and use his bathroom in his room to urinate. Patient had no urinary complaints to me at this time. Pt did say \"this is bullshit\" when he asked to use the \"staff only\" bathroom and he was told \"No\". Pt then went to his room and appeared to be asleep, so I left him be.  "
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
## 2024-01-27 LAB
25(OH)D3 SERPL-MCNC: 16 NG/ML (ref 30–100)
CHOLEST SERPL-MCNC: 175 MG/DL (ref 0–199)
CHOLESTEROL/HDL RATIO: 4.4
ERYTHROCYTE [SEDIMENTATION RATE] IN BLOOD BY WESTERGREN METHOD: 1 MM/H (ref 0–15)
GLUCOSE P FAST SERPL-MCNC: 73 MG/DL (ref 74–99)
HDLC SERPL-MCNC: 39.5 MG/DL
LDLC SERPL CALC-MCNC: 112 MG/DL
NON HDL CHOLESTEROL: 136 MG/DL (ref 0–149)
TREPONEMA PALLIDUM IGG+IGM AB [PRESENCE] IN SERUM OR PLASMA BY IMMUNOASSAY: NONREACTIVE
TRIGL SERPL-MCNC: 117 MG/DL (ref 0–149)
VLDL: 23 MG/DL (ref 0–40)

## 2024-01-27 PROCEDURE — 82947 ASSAY GLUCOSE BLOOD QUANT: CPT | Performed by: NURSE PRACTITIONER

## 2024-01-27 PROCEDURE — 86780 TREPONEMA PALLIDUM: CPT | Mod: GEALAB | Performed by: NURSE PRACTITIONER

## 2024-01-27 PROCEDURE — 2500000001 HC RX 250 WO HCPCS SELF ADMINISTERED DRUGS (ALT 637 FOR MEDICARE OP): Performed by: NURSE PRACTITIONER

## 2024-01-27 PROCEDURE — 82306 VITAMIN D 25 HYDROXY: CPT | Mod: GEALAB | Performed by: NURSE PRACTITIONER

## 2024-01-27 PROCEDURE — 36415 COLL VENOUS BLD VENIPUNCTURE: CPT | Performed by: NURSE PRACTITIONER

## 2024-01-27 PROCEDURE — 85652 RBC SED RATE AUTOMATED: CPT | Performed by: NURSE PRACTITIONER

## 2024-01-27 PROCEDURE — 1240000001 HC SEMI-PRIVATE BH ROOM DAILY

## 2024-01-27 PROCEDURE — 99233 SBSQ HOSP IP/OBS HIGH 50: CPT | Performed by: PSYCHIATRY & NEUROLOGY

## 2024-01-27 PROCEDURE — 2500000004 HC RX 250 GENERAL PHARMACY W/ HCPCS (ALT 636 FOR OP/ED): Performed by: INTERNAL MEDICINE

## 2024-01-27 PROCEDURE — 80061 LIPID PANEL: CPT | Performed by: NURSE PRACTITIONER

## 2024-01-27 PROCEDURE — 2500000004 HC RX 250 GENERAL PHARMACY W/ HCPCS (ALT 636 FOR OP/ED): Performed by: NURSE PRACTITIONER

## 2024-01-27 RX ORDER — CHOLECALCIFEROL (VITAMIN D3) 25 MCG
2000 TABLET ORAL DAILY
Status: DISCONTINUED | OUTPATIENT
Start: 2024-01-28 | End: 2024-02-02 | Stop reason: HOSPADM

## 2024-01-27 RX ADMIN — FLUOXETINE 20 MG: 20 CAPSULE ORAL at 08:33

## 2024-01-27 RX ADMIN — TAMSULOSIN HYDROCHLORIDE 0.4 MG: 0.4 CAPSULE ORAL at 08:33

## 2024-01-27 RX ADMIN — OLANZAPINE 7.5 MG: 5 TABLET, FILM COATED ORAL at 20:31

## 2024-01-27 ASSESSMENT — PAIN - FUNCTIONAL ASSESSMENT
PAIN_FUNCTIONAL_ASSESSMENT: 0-10
PAIN_FUNCTIONAL_ASSESSMENT: 0-10

## 2024-01-27 ASSESSMENT — PAIN SCALES - GENERAL
PAINLEVEL_OUTOF10: 0 - NO PAIN
PAINLEVEL_OUTOF10: 0 - NO PAIN

## 2024-01-27 NOTE — GROUP NOTE
Group Topic: Spiritual/Devotional/Thought of the Day   Group Date: 1/27/2024  Start Time: 0930  End Time: 1030  Facilitators: CHARAN Colbert   Department: [facility] REHAB THERAPY VIRTUAL    Number of Participants: 5   Group Focus: dual diagnosis, goals, personal responsibility, and self-awareness  Treatment Modality: Other: Recreational Therapy   Interventions utilized were exploration, patient education, and support  Purpose: maladaptive thinking, feelings, insight or knowledge, and self-worth    Name: Moris Conklin YOB: 1995   MR: [id number]      Facilitator: Recreational Therapist  Level of Participation: minimal  Quality of Participation: quiet and withdrawn  Interactions with others:  none  Mood/Affect: closed / guarded and flat  Triggers (if applicable): n/a  Cognition:  unknown, did not speak, left early   Progress: Minimal  Comments: Patients were provided with the Thought of the Day reading - “The power is mine to create positive habits.” Patients were given an opportunity to share their thoughts and encouraged to create a personal goal based on the reading. Pt in group area at start of group, but did not join main group. Accepted handout, but did not complete and left early with no interaction.   Plan: continue with services

## 2024-01-27 NOTE — CARE PLAN
"Patient is cooperative and pleasant with this nurse. During assessment he appears to be minimizing and very focused on discharge and physical issues. He denies anxiety, depression, SI/HI, and AH/VH. During conversation with  Patient was getting irritable from lack of understanding. He was confused and upset about pink slip and the voluntary form. After  Left room this nurse went back into patients room and explained in a different way how pink slips and voluntary forms work. Later in the day he asked to speak to the doctor. He apologized for his behavior when speaking with the doctor. He has taken 2 showers today. He is \"embarrassed\" about having a roommate and needing to have warm water flowing on his penis in order to void. He has been participating in group today. He is med compliant.      "

## 2024-01-27 NOTE — GROUP NOTE
Group Topic: Cognitive Focus   Group Date: 1/27/2024  Start Time: 1100  End Time: 1200  Facilitators: CHARAN Colbert   Department: [facility] REHAB THERAPY VIRTUAL    Number of Participants: 7   Group Focus: leisure skills, problem solving, and social skills  Treatment Modality: Other: Recreational Therapy   Interventions utilized were leisure development and mental fitness  Purpose: other: social engagement, leisure awareness, enjoyment     Name: Moris Conklin YOB: 1995   MR: [id number]      Facilitator: Recreational Therapist  Level of Participation: did not attend  Progress: None  Comments: Patient declined invitation to group activity at this time.   Patient will continue to be provided with opportunities to enhance leisure skills and/or coping mechanisms.  Plan: continue with services

## 2024-01-27 NOTE — NURSING NOTE
Patient is seclusive to self and in his room. Pt is not engaged in answering assessment questions. Pt barely made eye contact when spoken to. Pt was compliant with scheduled night medication.

## 2024-01-27 NOTE — CARE PLAN
The patient's goals for the shift include pt did not state    The clinical goals for the shift include pt did not state    Over the shift, the patient did make progress toward the following goals    Problem: Safety  Goal: Patient will be injury free during hospitalization  Outcome: Progressing  Goal: I will remain free of falls  Outcome: Progressing     Problem: Daily Care  Goal: Daily care needs are met  Outcome: Progressing     Problem: Psychosocial Needs  Goal: Demonstrates ability to cope with hospitalization/illness  Outcome: Progressing     Problem: Sensory Perceptual Alteration as Evidenced by  Goal: Will not act on psychotic perception  Outcome: Progressing     Problem: Potential for Harm to Self or Others  Goal: Denies harm toward self or others  Outcome: Progressing  Goal: Free from restraint events  Outcome: Progressing     Problem: Alteration in Sleep  Goal: STG - Reports nightly sleep, duration, and quality  Outcome: Progressing  Goal: STG - Identifies sleep hygiene aids  Outcome: Progressing

## 2024-01-27 NOTE — GROUP NOTE
Group Topic: Art Creative   Group Date: 1/27/2024  Start Time: 1400  End Time: 1530  Facilitators: TRACI ColbertS   Department: [facility] REHAB THERAPY VIRTUAL    Number of Participants: 11   Group Focus: art therapy and leisure skills  Treatment Modality: Other: Recreational Therapy   Interventions utilized were exploration and leisure development  Purpose: other: creative expression, social engagement, leisure awareness, enjoyment     Name: Moris Conklin YOB: 1995   MR: [id number]      Facilitator: Recreational Therapist  Level of Participation: active  Quality of Participation: appropriate/pleasant, cooperative, and engaged  Interactions with others:  minimal  Mood/Affect: appropriate and closed / guarded  Triggers (if applicable): n/a  Cognition: coherent/clear and goal directed  Progress: Moderate  Comments: Patients were gathered to participate in watercolor painting/velvet coloring pictures. This activity works on creative expression, fine motor skills, following directions, positive social interaction, and leisure awareness. Pt engaged and focused on coloring/drawing project. Minimal interaction with peers/staff, but overall appropriate.   Plan: continue with services
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
## 2024-01-28 PROCEDURE — 99233 SBSQ HOSP IP/OBS HIGH 50: CPT | Performed by: PSYCHIATRY & NEUROLOGY

## 2024-01-28 PROCEDURE — 2500000004 HC RX 250 GENERAL PHARMACY W/ HCPCS (ALT 636 FOR OP/ED): Performed by: NURSE PRACTITIONER

## 2024-01-28 PROCEDURE — 2500000001 HC RX 250 WO HCPCS SELF ADMINISTERED DRUGS (ALT 637 FOR MEDICARE OP): Performed by: NURSE PRACTITIONER

## 2024-01-28 PROCEDURE — 1240000001 HC SEMI-PRIVATE BH ROOM DAILY

## 2024-01-28 PROCEDURE — 2500000001 HC RX 250 WO HCPCS SELF ADMINISTERED DRUGS (ALT 637 FOR MEDICARE OP): Performed by: PSYCHIATRY & NEUROLOGY

## 2024-01-28 PROCEDURE — 2500000004 HC RX 250 GENERAL PHARMACY W/ HCPCS (ALT 636 FOR OP/ED): Performed by: INTERNAL MEDICINE

## 2024-01-28 RX ADMIN — FLUOXETINE 30 MG: 20 CAPSULE ORAL at 08:33

## 2024-01-28 RX ADMIN — TAMSULOSIN HYDROCHLORIDE 0.4 MG: 0.4 CAPSULE ORAL at 08:33

## 2024-01-28 RX ADMIN — Medication 2000 UNITS: at 08:34

## 2024-01-28 RX ADMIN — OLANZAPINE 10 MG: 5 TABLET, FILM COATED ORAL at 20:35

## 2024-01-28 ASSESSMENT — PAIN - FUNCTIONAL ASSESSMENT: PAIN_FUNCTIONAL_ASSESSMENT: 0-10

## 2024-01-28 ASSESSMENT — PAIN SCALES - GENERAL: PAINLEVEL_OUTOF10: 0 - NO PAIN

## 2024-01-28 NOTE — NURSING NOTE
"[facility]  Pt is watching TV   He is quiet and soft spoken  Pt stated his goal \"get sleep\"  his strength \" I help people\" and his coping skill \"I work\"  Pt denied everything a this time  Pt is appropriate with his answers to the questions at this time    "

## 2024-01-28 NOTE — NURSING NOTE
Pt took his night time medications  Pt watched the movie on the TV  Pt went to bed and slept through the night  Pt had an uneventful night

## 2024-01-28 NOTE — GROUP NOTE
Group Topic: Art Creative   Group Date: 1/28/2024  Start Time: 1400  End Time: 1545  Facilitators: CHARAN Colbert   Department: [facility] REHAB THERAPY VIRTUAL    Number of Participants: 13   Group Focus: art therapy and leisure skills  Treatment Modality: Other: Recreational Therapy   Interventions utilized were exploration and leisure development  Purpose: other: creative expression, social engagement, leisure awareness, enjoyment     Name: Moris Conklin YOB: 1995   MR: [id number]      Facilitator: Recreational Therapist  Level of Participation: active  Quality of Participation: appropriate/pleasant, cooperative, and engaged  Interactions with others:  minimal and appropriate  Mood/Affect: appropriate and brightens with interaction  Triggers (if applicable): n/a  Cognition: coherent/clear and goal directed  Progress: Moderate  Comments: Patients were gathered to participate in painting/ceramics. This activity works on creative expression, fine motor skills, following directions, positive social interaction, and leisure awareness. Pt engaged and focused on task. Calm, appropriate, and pleasant.   Plan: continue with services

## 2024-01-28 NOTE — CARE PLAN
"The patient's goals for the shift include \"get sleep\"    The clinical goals for the shift include medication compliance    Over the shift, the patient did not make progress toward the following goals. Barriers to progression include lack of knowledge on medications . Recommendations to address these barriers include more education on medications.    "

## 2024-01-28 NOTE — GROUP NOTE
Group Topic: Spiritual/Devotional/Thought of the Day   Group Date: 1/28/2024  Start Time: 0930  End Time: 1020  Facilitators: CHARAN Colbert   Department: [facility] REHAB THERAPY VIRTUAL    Number of Participants: 13   Group Focus: goals, personal responsibility, problem solving, self-awareness, self-esteem, and social skills  Treatment Modality: Other: Recreational Therapy   Interventions utilized were exploration, leisure development, mental fitness, problem solving, and support  Purpose: maladaptive thinking, insight or knowledge, and self-worth    Name: Moris Conklin YOB: 1995   MR: [id number]      Facilitator: Recreational Therapist  Level of Participation: did not attend  Progress: None  Comments: Patient declined invitation to group activity at this time.   Patient will continue to be provided with opportunities to enhance leisure skills and/or coping mechanisms.  Plan: continue with services

## 2024-01-28 NOTE — GROUP NOTE
Group Topic: Gross Motor/Balance Skills   Group Date: 1/28/2024  Start Time: 1110  End Time: 1210  Facilitators: CHARAN Colbert   Department: [facility]  REHAB THERAPY VIRTUAL    Number of Participants: 14   Group Focus: leisure skills and social skills  Treatment Modality: Other: Recreational Therapy   Interventions utilized were exploration and leisure development  Purpose: other: physical movement, social engagement, leisure awareness, healthy competition, enjoyment     Name: Moris Conklin YOB: 1995   MR: [id number]      Facilitator: Recreational Therapist  Level of Participation: active  Quality of Participation: appropriate/pleasant, cooperative, and engaged  Interactions with others: appropriate  Mood/Affect: appropriate  Triggers (if applicable): n/a  Cognition: coherent/clear  Progress: Moderate  Comments: Patients engaged in the skilled session of Jesse. This recreational pursuit honed participants' hand-eye coordination and fine motor skills along with cultivating a convivial atmosphere, promoting social interaction and fostering a sense of achievement. The strategic nuances of the game also elevated both cognitive engagement and camaraderie contributing to the multifaceted well-being of those involved in this engaging therapeutic activity.   Pt engaged in task with encouragement. Calm and appropriate, limited interaction with peers.   Plan: continue with services

## 2024-01-28 NOTE — CARE PLAN
Patient is pleasant and cooperative. He has been attending groups. He is med compliant. He is not social with peers. Denies anxiety, depression, SI/HI, and AH/VH. This nurse spoke to patients mom today. Patient did sign a SALMA for her. She is concerned about him saying what he needs to to get out of the situation he is in. She doesn't believe patient is going to be okay to come home and that coming home would be more detrimental to him. He has only asked to use shower once today. Mom also thinks that if patient was able to get a hair cut and trim his beard while in the hospital that he would be more willing to socialize with peers. He is receiving a boost supplement with each meal.
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
## 2024-01-29 PROCEDURE — 2500000001 HC RX 250 WO HCPCS SELF ADMINISTERED DRUGS (ALT 637 FOR MEDICARE OP): Performed by: NURSE PRACTITIONER

## 2024-01-29 PROCEDURE — 2500000004 HC RX 250 GENERAL PHARMACY W/ HCPCS (ALT 636 FOR OP/ED): Performed by: INTERNAL MEDICINE

## 2024-01-29 PROCEDURE — 2500000001 HC RX 250 WO HCPCS SELF ADMINISTERED DRUGS (ALT 637 FOR MEDICARE OP): Performed by: PSYCHIATRY & NEUROLOGY

## 2024-01-29 PROCEDURE — 97150 GROUP THERAPEUTIC PROCEDURES: CPT | Mod: GO,CO

## 2024-01-29 PROCEDURE — 1240000001 HC SEMI-PRIVATE BH ROOM DAILY

## 2024-01-29 PROCEDURE — 99233 SBSQ HOSP IP/OBS HIGH 50: CPT | Performed by: NURSE PRACTITIONER

## 2024-01-29 PROCEDURE — 2500000004 HC RX 250 GENERAL PHARMACY W/ HCPCS (ALT 636 FOR OP/ED): Performed by: NURSE PRACTITIONER

## 2024-01-29 RX ADMIN — Medication 2000 UNITS: at 08:34

## 2024-01-29 RX ADMIN — FLUOXETINE 40 MG: 20 CAPSULE ORAL at 08:34

## 2024-01-29 RX ADMIN — TAMSULOSIN HYDROCHLORIDE 0.4 MG: 0.4 CAPSULE ORAL at 08:34

## 2024-01-29 RX ADMIN — OLANZAPINE 10 MG: 5 TABLET, FILM COATED ORAL at 21:00

## 2024-01-29 ASSESSMENT — PAIN - FUNCTIONAL ASSESSMENT
PAIN_FUNCTIONAL_ASSESSMENT: 0-10
PAIN_FUNCTIONAL_ASSESSMENT: 0-10

## 2024-01-29 ASSESSMENT — PAIN SCALES - GENERAL
PAINLEVEL_OUTOF10: 0 - NO PAIN
PAINLEVEL_OUTOF10: 0 - NO PAIN

## 2024-01-29 NOTE — CARE PLAN
"Patient out on unit for most of shift, withdrawn to self. Denied anxiety and depression. Denied SI/HI. Denied auditory/visual/other hallucinations. No complaints of pain or discomfort. Medication compliant. Able to state positive coping skills such as \"reading\". The patient's goals for the shift include \"to get good sleep\". Q15 minute checks to be maintained throughout shift for safety.       "

## 2024-01-29 NOTE — CARE PLAN
"The patient's goals for the shift include \"sleep good\"    The clinical goals for the shift include medication compliance    Over the shift, the patient made progress towards care plan goals. Pt rested quietly through the night. No PRNs given, no changes.  "

## 2024-01-29 NOTE — NURSING NOTE
"Upon assessment pt is calm, cooperative. Pt denied anxiety, depression, pain, SI/HI and hallucinations. Pt reported strengths as \"genuine, good to be around\", goal \"sleep good\" and coping skills \"reading\". Pt is withdrawn to self, seen mostly in the hallway reading at the table. Pt took nighttime scheduled zyprexa and went to bed. No concerns at this time. Q15 minute safety checks maintained.   "
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
## 2024-01-29 NOTE — CARE PLAN
Discussed in Treatment team today;  Met with mom today; she stated she is still very concerned, thinks he is not ready for discharge today;  told her we are still stabilizing;  she insisted on talking to someone about his care;  Provider Joselin Beard agreed and tomeka called mom back and scheduled 2 pm family meeting on Wednesday, 01/31.  Sw to follow.

## 2024-01-30 LAB
ATRIAL RATE: 68 BPM
P AXIS: 50 DEGREES
P OFFSET: 200 MS
P ONSET: 150 MS
PR INTERVAL: 124 MS
Q ONSET: 212 MS
QRS COUNT: 11 BEATS
QRS DURATION: 110 MS
QT INTERVAL: 394 MS
QTC CALCULATION(BAZETT): 418 MS
QTC FREDERICIA: 411 MS
R AXIS: 69 DEGREES
T AXIS: 47 DEGREES
T OFFSET: 409 MS
VENTRICULAR RATE: 68 BPM

## 2024-01-30 PROCEDURE — 2500000001 HC RX 250 WO HCPCS SELF ADMINISTERED DRUGS (ALT 637 FOR MEDICARE OP): Performed by: PSYCHIATRY & NEUROLOGY

## 2024-01-30 PROCEDURE — 2500000004 HC RX 250 GENERAL PHARMACY W/ HCPCS (ALT 636 FOR OP/ED): Performed by: NURSE PRACTITIONER

## 2024-01-30 PROCEDURE — 2500000001 HC RX 250 WO HCPCS SELF ADMINISTERED DRUGS (ALT 637 FOR MEDICARE OP): Performed by: NURSE PRACTITIONER

## 2024-01-30 PROCEDURE — 1240000001 HC SEMI-PRIVATE BH ROOM DAILY

## 2024-01-30 PROCEDURE — 2500000004 HC RX 250 GENERAL PHARMACY W/ HCPCS (ALT 636 FOR OP/ED): Performed by: INTERNAL MEDICINE

## 2024-01-30 PROCEDURE — 99233 SBSQ HOSP IP/OBS HIGH 50: CPT | Performed by: NURSE PRACTITIONER

## 2024-01-30 PROCEDURE — 97150 GROUP THERAPEUTIC PROCEDURES: CPT | Mod: GO,CO

## 2024-01-30 RX ORDER — OLANZAPINE 5 MG/1
15 TABLET ORAL NIGHTLY
Status: DISCONTINUED | OUTPATIENT
Start: 2024-01-30 | End: 2024-02-02 | Stop reason: HOSPADM

## 2024-01-30 RX ADMIN — Medication 2000 UNITS: at 09:11

## 2024-01-30 RX ADMIN — TAMSULOSIN HYDROCHLORIDE 0.4 MG: 0.4 CAPSULE ORAL at 09:11

## 2024-01-30 RX ADMIN — FLUOXETINE 40 MG: 20 CAPSULE ORAL at 09:11

## 2024-01-30 RX ADMIN — OLANZAPINE 15 MG: 5 TABLET, FILM COATED ORAL at 20:45

## 2024-01-30 ASSESSMENT — PAIN SCALES - GENERAL
PAINLEVEL_OUTOF10: 0 - NO PAIN
PAINLEVEL_OUTOF10: 0 - NO PAIN

## 2024-01-30 ASSESSMENT — PAIN SCALES - WONG BAKER: WONGBAKER_NUMERICALRESPONSE: NO HURT

## 2024-01-30 ASSESSMENT — PAIN - FUNCTIONAL ASSESSMENT
PAIN_FUNCTIONAL_ASSESSMENT: 0-10
PAIN_FUNCTIONAL_ASSESSMENT: 0-10

## 2024-01-30 NOTE — GROUP NOTE
Group Topic: Music Therapy   Group Date: 1/30/2024  Start Time: 1100  End Time: 1200  Facilitators: Mina Joaquin   Department: [facility] EXPRESSIVE THER VIRTUAL    Number of Participants: 10   Group Focus: coping skills/planning, expressive outlet, and opportunity for choice/control  Treatment Modality: Music Therapy  Interventions Utilized were: active music engagement, empathic listening/validating emotions, passive music engagement, sharing/discussion, and songwriting/composition      Name: Moris Conklin YOB: 1995   MR: [id number]      Level of Participation:  Wandering  Quality of Participation:  Did not participate in group today.  Interactions with others:  NA  Mood/Affect:  NA  Cognition, Pre Treatment:  NA  Cognition, Post Treatment:  NA  Progress: None  Plan: continue with services

## 2024-01-30 NOTE — CARE PLAN
"The patient's goals for the shift include \"to get good sleep\"    The clinical goals for the shift include medication compliance    Over the shift, the patient did not make progress toward the following goals. Barriers to progression include lack of medication knowledge . Recommendations to address these barriers include more education on medications.    "

## 2024-01-30 NOTE — NURSING NOTE
"Pt interview in the front [facility]  Pt is soft spoken  Pt stated her goal \"get sleep\"  his strength \"I draw\"  and his coping skill \"I lay down\"  Pt denied everything else at this time  Pt is appropriate with his answers to the questions at this time    "
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
## 2024-01-30 NOTE — CARE PLAN
"Patient out on the unit and social with peers this shift. Pt attended groups throughout shift. Denied anxiety and depression. Denied SI/HI. Denied auditory/visual/other hallucinations. No complaints of pain or discomfort. Medication compliant. Able to state positive coping skills such as \"reading\". The patient's goals for the shift include \"getting healthy this year\". Q15 minute checks to be maintained throughout shift for safety.     "

## 2024-01-30 NOTE — NURSING NOTE
Pt took a shower last night  came out after  and read a book  Pt took his night time medications and went to bed  Pt had an uneventful night

## 2024-01-30 NOTE — GROUP NOTE
"Group Topic: Cognitive Focus   Group Date: 1/30/2024  Start Time: 1410  End Time: 1445  Facilitators: CHARAN Colbert   Department: [facility] REHAB THERAP[facility]IRT[facility]    Number of Participants: 9   Group Focus: communication, leisure skills, and problem solving  Treatment Modality: Other: Recreational Therapy   Interventions utilized were exploration and leisure development  Purpose: other: creative expression, social engagement, leisure awareness, enjoyment     Name: Moris Conklin YOB: 1995   MR: [id number]      Facilitator: Recreational Therapist  Level of Participation: active  Quality of Participation: appropriate/pleasant, cooperative, and engaged  Interactions with others: appropriate  Mood/Affect: appropriate  Triggers (if applicable): n/a  Cognition: coherent/clear  Progress: Moderate  Comments: Patients were gathered to participate in the game \"I Should Have Known That\". This activity works on cognitive skills, following directions, positive social interaction/teamwork, and promotes leisure awareness. Pt was engaged in activity with some encouragement from peers/staff. Calm and appropriate.   Plan: continue with services      "

## 2024-01-30 NOTE — GROUP NOTE
"Group Topic: Art Creative   Group Date: 1/30/2024  Start Time: 1530  End Time: 1600  Facilitators: CHARAN Colbert   Department: [facility] REHAB THERAP[facility]IRT[facility]    Number of Participants: 8   Group Focus: art therapy, goals, and leisure skills  Treatment Modality: Other: Recreational Therapy   Interventions utilized were exploration, leisure development, and support  Purpose: other: creative expression, leisure awareness, social engagement, gratitude, enjoyment     Name: Moris Conklin YOB: 1995   MR: [id number]      Facilitator: Recreational Therapist  Level of Participation: minimal  Quality of Participation: cooperative, quiet, and withdrawn  Interactions with others:  minimal and appropriate  Mood/Affect: closed / guarded  Triggers (if applicable): n/a  Cognition: coherent/clear  Progress: Minimal  Comments: Patients were gathered to participate in \"Create a Character\". This activity works on creative expression, fine motor skills, following directions, positive social interaction, and leisure awareness. Pt arrived late after initially declined group. Did not engage in task, but stated, \"these doctors don't know anything about me, I don't agree with anything they are saying, but I'll go along with it, I'll play along while I'm here, but I'm telling you they are wrong.\" Pt provided with support and positive feedback which he accepted.   Plan: continue with services      "

## 2024-01-31 PROCEDURE — 2500000004 HC RX 250 GENERAL PHARMACY W/ HCPCS (ALT 636 FOR OP/ED): Performed by: NURSE PRACTITIONER

## 2024-01-31 PROCEDURE — 2500000001 HC RX 250 WO HCPCS SELF ADMINISTERED DRUGS (ALT 637 FOR MEDICARE OP): Performed by: NURSE PRACTITIONER

## 2024-01-31 PROCEDURE — 2500000001 HC RX 250 WO HCPCS SELF ADMINISTERED DRUGS (ALT 637 FOR MEDICARE OP): Performed by: PSYCHIATRY & NEUROLOGY

## 2024-01-31 PROCEDURE — 97150 GROUP THERAPEUTIC PROCEDURES: CPT | Mod: GO,CO

## 2024-01-31 PROCEDURE — 99233 SBSQ HOSP IP/OBS HIGH 50: CPT | Performed by: NURSE PRACTITIONER

## 2024-01-31 PROCEDURE — 1240000001 HC SEMI-PRIVATE BH ROOM DAILY

## 2024-01-31 PROCEDURE — 2500000004 HC RX 250 GENERAL PHARMACY W/ HCPCS (ALT 636 FOR OP/ED): Performed by: INTERNAL MEDICINE

## 2024-01-31 RX ADMIN — Medication 2000 UNITS: at 09:17

## 2024-01-31 RX ADMIN — OLANZAPINE 15 MG: 5 TABLET, FILM COATED ORAL at 21:01

## 2024-01-31 RX ADMIN — FLUOXETINE 40 MG: 20 CAPSULE ORAL at 09:17

## 2024-01-31 RX ADMIN — TAMSULOSIN HYDROCHLORIDE 0.4 MG: 0.4 CAPSULE ORAL at 09:17

## 2024-01-31 ASSESSMENT — PAIN SCALES - GENERAL
PAINLEVEL_OUTOF10: 0 - NO PAIN
PAINLEVEL_OUTOF10: 0 - NO PAIN

## 2024-01-31 ASSESSMENT — PAIN - FUNCTIONAL ASSESSMENT
PAIN_FUNCTIONAL_ASSESSMENT: 0-10
PAIN_FUNCTIONAL_ASSESSMENT: 0-10

## 2024-01-31 NOTE — NURSING NOTE
"The pt was calm and cooperative during the interview that took place at the end of the judge away from his peers for privacy. Pt denies anxiety and depression rating them both 0/10, denies SI, HI and AVH at this time as well as pain rating it a 0/10. Last bowel movement was, \"today\" 1/30/24. Coping skills, \"Reading, talking, solving the problem whatever that is.\" Goal for the evening, \"Not to wake up as much throughout the night.\" Pt strengths, \"I'm a fast learned and I'm cooperative.\" The pt was medication compliant with his evening medication. 15 minute monitoring continued.   "

## 2024-01-31 NOTE — PROGRESS NOTES
"Occupational Therapy     REHAB Therapy Assessment & Treatment    Patient Name: Moris Conklin  MRN: [id number]  Today's Date: 1/31/2024      Activity Assessment:     Boundaries/ Importance and Identification Group: [id number]  Meaningful Sharing/ Healthy Habits Group: [id number]  Music as a Coping Tool Group: [id number]    3/3 groups attended       Pt present and participates in all groups with slightly improved attention to task and continued improved sharing of his thoughts. Pt with improved insight when sharing aloud and is able to share what setting a boundary means to him in his own words. Pt then creates his own personal boundaries to focus on at discharge sharing \"I am not my past, I am my future, I cannot control free will, and I do not want toxicity in my life\" During meaningful sharing group, pt with continued improved mood/brighter affect, shares appropriately as well as able to ID X2 positive habits he currently engages in. Pt continues to make small improvements toward OT goals each day as evidenced above. Pt would benefit from continued OT services in order to improve overall self-esteem, personal confidence and supports with increased awareness for safe transition location at discharge.      Encounter Problems       Encounter Problems (Active)       OT Goals       Pt will ID 1-2 LTG and 2-3 STG, including methods to achieve goals upon discharge (Progressing)       Start:  01/26/24    Expected End:  02/23/24            Pt will explore and ID 1-2 strategies to manage stressors/symptoms of illness/ grief more effectively prior to discharge.   (Progressing)       Start:  01/26/24    Expected End:  02/23/24            Pt will explore and ID 1-2 effective methods of expressing feelings, want and needs prior to discharge.   (Progressing)       Start:  01/26/24    Expected End:  02/23/24            Pt will ID 2-3 effective ways to distract from crisis and ID stressors/ personal symptoms of stress in order to " improve management of stress during daily activities.   (Progressing)       Start:  01/26/24    Expected End:  02/23/24            Pt will appropriately participate in group and 1:1 sessions, 90% of the time (Progressing)       Start:  01/26/24    Expected End:  02/23/24                         Additional Comments:  ORELLANA collaborated with patients nurse and charge nurse throughout the day to provide the appropriate support and encouragement to attend groups. Pt up on unit when ORELLANA left last group of the day. All needs met.

## 2024-01-31 NOTE — CARE PLAN
"Patient out on the unit throughout shift, occasionally resting in room. Denied anxiety and depression. Pt attended groups this shift. Denied SI/HI. Denied auditory/visual/other hallucinations. No complaints of pain or discomfort. Medication compliant. Able to state positive coping skills such as \"reading\". The patient's goals for the shift include \"to get out today\". Q15 minute checks to be maintained throughout shift for safety.     "
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
## 2024-01-31 NOTE — CARE PLAN
"  Problem: Safety  Goal: I will remain free of falls  Outcome: Progressing     Problem: Daily Care  Goal: Daily care needs are met  Outcome: Progressing     Problem: Psychosocial Needs  Goal: Collaborate with me, my family, and caregiver to identify my specific goals  Recent Flowsheet Documentation  Taken 1/30/2024 2100 by Vidhya Amin RN  Cultural Requests During Hospitalization: none  Spiritual Requests During Hospitalization: none     Problem: Ineffective Coping  Goal: Identifies healthy coping skills  Outcome: Progressing   The patient's goals for the shift include \"No to wake up some much throughout the night\"    The clinical goals for the shift include maintain safety    Over the shift, the patient did make progress toward the following goals.     "

## 2024-01-31 NOTE — GROUP NOTE
Group Topic: Art Creative   Group Date: 1/31/2024  Start Time: 1400  End Time: 1540  Facilitators: CHARAN Colbert   Department: [facility] REHAB THERAPY VIRTUAL    Number of Participants: 6   Group Focus: art therapy and leisure skills  Treatment Modality: Other: Recreational Therapy   Interventions utilized were exploration and leisure development  Purpose: other: creative expression, social engagement, leisure awareness, enjoyment     Name: Moris Conklin YOB: 1995   MR: [id number]      Facilitator: Recreational Therapist  Level of Participation: active  Quality of Participation: appropriate/pleasant, cooperative, and engaged  Interactions with others: appropriate  Mood/Affect: appropriate  Triggers (if applicable): n/a  Cognition: coherent/clear  Progress: Moderate  Comments: Patients were gathered to participate in [employer]. This activity works on creative expression, fine motor skills, following directions, positive social interaction, and leisure awareness. Pt arrived late after family meeting. Engaged and focused on task appropriately.   Plan: continue with services

## 2024-01-31 NOTE — CARE PLAN
"Family meeting was conducted today, with patient, mother, step dad and maternal grandmother along with Provider Joselin Beard: family is supportive but do not believe he is better, that he needs to be here longer;  patient became persistent and insistent that he be discharge today;  patient was told multiple times that discharge would not be today, yet he continued to insist he was better, that he can go and that he needed to go today;  Patient agreed to go to step down group therapy (IOP/PHP) at discharge and follow up with Abdirashid;  Patient agreed to take his medications, shower and work with family as \"I am doing so much better\";  Told patient he needs to show longer term improvement; patient became irritable, argumentative and meeting needed to be concluded;  sw to follow.   "

## 2024-01-31 NOTE — PROGRESS NOTES
"Occupational Therapy     REHAB Therapy Assessment & Treatment    Patient Name: Moris Conklin  MRN: [id number]  Today's Date: 1/31/2024      Activity Assessment:     Happiness/ Self [place] Group: [id number]  Improving Self Esteem/ Personal Strengths Group: [id number]  Meaningful Leisure Group: [id number]    3/3 Groups attended     Pt present and engages in all groups with improved social interaction, sharing, and comfort level while in group.  Pt demo's G understanding of happiness survey as pt appropriately completes and shares \"not being surprised by my answers\" Pt also shares understanding of how he can take responsibility to improve quality of life in order to improve his happiness however, does not share aloud \"how\" he would begin taking steps for this. During self esteem discussion, pt is however, able to share aloud ways he could improve being more mindful as well as X1 self care task he can engage in to improve his confidence self esteem. Pt then shares X1 personal strength aloud when asked as well as neftali's improved social interaction and appropriate competitive behaviors during leisure activity. Pt with increased smiling/laughing during leisure group and neftali' understanding of reasoning for engaging in similar tasks to use as a positive distraction to stress. Pt continues to make small improvements each day in mood regulation, social interaction, and insight. Pt would benefit from continued OT services in order to improve overall self-esteem, personal confidence and supports with increased awareness for safe transition location at discharge.        Encounter Problems       Encounter Problems (Active)       OT Goals       Pt will ID 1-2 LTG and 2-3 STG, including methods to achieve goals upon discharge (Progressing)       Start:  01/26/24    Expected End:  02/23/24            Pt will explore and ID 1-2 strategies to manage stressors/symptoms of illness/ grief more effectively prior to discharge.   " (Progressing)       Start:  01/26/24    Expected End:  02/23/24            Pt will explore and ID 1-2 effective methods of expressing feelings, want and needs prior to discharge.   (Progressing)       Start:  01/26/24    Expected End:  02/23/24            Pt will ID 2-3 effective ways to distract from crisis and ID stressors/ personal symptoms of stress in order to improve management of stress during daily activities.   (Progressing)       Start:  01/26/24    Expected End:  02/23/24            Pt will appropriately participate in group and 1:1 sessions, 90% of the time (Progressing)       Start:  01/26/24    Expected End:  02/23/24                           Additional Comments:    ORELLANA collaborated with patients nurse and charge nurse throughout the day to provide the appropriate support and encouragement to attend groups. Pt up on unit when ORELLANA left last group of the day. All needs met.

## 2024-02-01 PROBLEM — F23 ACUTE PSYCHOSIS (MULTI): Status: RESOLVED | Noted: 2024-01-25 | Resolved: 2024-02-01

## 2024-02-01 PROBLEM — F30.9: Status: RESOLVED | Noted: 2018-09-24 | Resolved: 2024-02-01

## 2024-02-01 PROBLEM — F17.200 NICOTINE DEPENDENCE, UNSPECIFIED, UNCOMPLICATED: Status: RESOLVED | Noted: 2018-09-24 | Resolved: 2024-02-01

## 2024-02-01 PROBLEM — F12.90 CANNABIS USE, UNSPECIFIED, UNCOMPLICATED: Status: RESOLVED | Noted: 2018-09-24 | Resolved: 2024-02-01

## 2024-02-01 PROCEDURE — 2500000001 HC RX 250 WO HCPCS SELF ADMINISTERED DRUGS (ALT 637 FOR MEDICARE OP): Performed by: PSYCHIATRY & NEUROLOGY

## 2024-02-01 PROCEDURE — 2500000004 HC RX 250 GENERAL PHARMACY W/ HCPCS (ALT 636 FOR OP/ED): Performed by: INTERNAL MEDICINE

## 2024-02-01 PROCEDURE — 99233 SBSQ HOSP IP/OBS HIGH 50: CPT | Performed by: NURSE PRACTITIONER

## 2024-02-01 PROCEDURE — 2500000001 HC RX 250 WO HCPCS SELF ADMINISTERED DRUGS (ALT 637 FOR MEDICARE OP): Performed by: NURSE PRACTITIONER

## 2024-02-01 PROCEDURE — 97150 GROUP THERAPEUTIC PROCEDURES: CPT | Mod: GO,CO

## 2024-02-01 PROCEDURE — 1240000001 HC SEMI-PRIVATE BH ROOM DAILY

## 2024-02-01 PROCEDURE — 2500000004 HC RX 250 GENERAL PHARMACY W/ HCPCS (ALT 636 FOR OP/ED): Performed by: NURSE PRACTITIONER

## 2024-02-01 RX ADMIN — Medication 2000 UNITS: at 08:30

## 2024-02-01 RX ADMIN — FLUOXETINE 40 MG: 20 CAPSULE ORAL at 08:30

## 2024-02-01 RX ADMIN — TAMSULOSIN HYDROCHLORIDE 0.4 MG: 0.4 CAPSULE ORAL at 08:30

## 2024-02-01 RX ADMIN — OLANZAPINE 15 MG: 5 TABLET, FILM COATED ORAL at 20:31

## 2024-02-01 ASSESSMENT — PAIN - FUNCTIONAL ASSESSMENT: PAIN_FUNCTIONAL_ASSESSMENT: 0-10

## 2024-02-01 ASSESSMENT — PAIN SCALES - GENERAL
PAINLEVEL_OUTOF10: 0 - NO PAIN
PAINLEVEL_OUTOF10: 0 - NO PAIN

## 2024-02-01 ASSESSMENT — COLUMBIA-SUICIDE SEVERITY RATING SCALE - C-SSRS
6. HAVE YOU EVER DONE ANYTHING, STARTED TO DO ANYTHING, OR PREPARED TO DO ANYTHING TO END YOUR LIFE?: NO
1. SINCE LAST CONTACT, HAVE YOU WISHED YOU WERE DEAD OR WISHED YOU COULD GO TO SLEEP AND NOT WAKE UP?: NO
2. HAVE YOU ACTUALLY HAD ANY THOUGHTS OF KILLING YOURSELF?: NO

## 2024-02-01 ASSESSMENT — PAIN SCALES - WONG BAKER: WONGBAKER_NUMERICALRESPONSE: NO HURT
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
## 2024-02-01 NOTE — CARE PLAN
"The patient's goals for the shift include \"sleep/stay asleep\"    The clinical goals for the shift include medication compliance    Over the shift, the patient did make progress toward the following goals    Problem: Safety  Goal: Patient will be injury free during hospitalization  Outcome: Progressing  Goal: I will remain free of falls  Outcome: Progressing     Problem: Daily Care  Goal: Daily care needs are met  Outcome: Progressing     Problem: Psychosocial Needs  Goal: Demonstrates ability to cope with hospitalization/illness  Outcome: Progressing  Goal: Collaborate with me, my family, and caregiver to identify my specific goals  Outcome: Progressing     Problem: Alteration in Sleep  Goal: STG - Reports nightly sleep, duration, and quality  Outcome: Progressing  Goal: STG - Identifies sleep hygiene aids  Outcome: Progressing  Goal: STG - Informs staff if unable to sleep  Outcome: Progressing     "

## 2024-02-01 NOTE — NURSING NOTE
"Patient is out in the lounge sitting at a table with other patients eating snack,talking with other patients and watching basketball. Pt is pleasant and engaged in answering assessment questions. Pt denied anxiety, denied depression,denied si/hi,denied a/v hallucinations. Pt is medication compliant. Pt stated the family meeting today \"went as he expected, but it was good\".  "

## 2024-02-01 NOTE — GROUP NOTE
"Group Topic: Cognitive Focus   Group Date: 2/1/2024  Start Time: 1400  End Time: 1500  Facilitators: TRACI TuckerS   Department: [facility] REHAB THERAPY VIRTUAL    Number of Participants: 8   Group Focus: concentration, leisure skills, and social skills  Treatment Modality: Other: Recreation Therapy  Interventions utilized were leisure development  Purpose: other: increase socialization, enhance mood, use communications kills, following directions     Name: Moris Conklin YOB: 1995   MR: [id number]      Facilitator: Recreational Therapist  Level of Participation: moderate  Quality of Participation: appropriate/pleasant, cooperative, engaged, and initiates communication  Interactions with others: appropriate  Mood/Affect: appropriate and brightens with interaction  Triggers (if applicable): n/a  Cognition: coherent/clear  Progress: Moderate  Comments: pt engaged in a group game \"apples to apples\" which is used to focus on cognitive skills, following directions, increase leisure development skills and promote positive social interacts. Pt in and out of group game. Engages with staff and peers appropriately when joined.   Plan: continue with services      "

## 2024-02-01 NOTE — NURSING NOTE
"Pt has been up on unit attending groups , he is more social then he has been with peers , he is engaging more with staff . Pt stated \" My family meeting went ok it was what I expected\" Anxiety 0/10 depression 0/10. Pt denied SI/HI and A/V/H. Safety maintained. Med compliant. Pt contracted for safety with this staff at this time.   "

## 2024-02-01 NOTE — CARE PLAN
"The patient's goals for the shift include \"sleep/stay asleep\"    The clinical goals for the shift include treatment compliant    Over the shift, the patient did not make progress toward the following goals. Barriers to progression include acuteness of illness. Recommendations to address these barriers include positive reinforcement .    Problem: Pain  Goal: My pain/discomfort is manageable  Outcome: Progressing     Problem: Safety  Goal: Patient will be injury free during hospitalization  Outcome: Progressing  Goal: I will remain free of falls  Outcome: Progressing     Problem: Daily Care  Goal: Daily care needs are met  Outcome: Progressing     Problem: Psychosocial Needs  Goal: Demonstrates ability to cope with hospitalization/illness  Outcome: Progressing  Goal: Collaborate with me, my family, and caregiver to identify my specific goals  Outcome: Progressing  Flowsheets (Taken 2/1/2024 1826)  Cultural Requests During Hospitalization: none  Spiritual Requests During Hospitalization: none     Problem: Discharge Barriers  Goal: My discharge needs are met  Outcome: Progressing     Problem: Sensory Perceptual Alteration as Evidenced by  Goal: Cooperates with admission process  Outcome: Progressing  Goal: Patient/Family participate in treatment and discharge plans  Outcome: Progressing  Goal: Patient/Family verbalizes awareness of resources  Outcome: Progressing  Goal: Participates in unit activities  Outcome: Progressing  Goal: Discusses signs/symptoms of illness/treatment options  Outcome: Progressing  Goal: Initiates reality-based interactions  Outcome: Progressing  Goal: Able to discuss content of hallucinations/delusions  Outcome: Progressing  Goal: Notifies staff when experiencing hallucinations/delusions  Outcome: Progressing  Goal: Verbalizes reduction in hallucinations/delusions  Outcome: Progressing  Goal: Will not act on psychotic perception  Outcome: Progressing  Goal: Understands least restrictive " measures  Outcome: Progressing  Goal: Free from restraint events  Outcome: Progressing     Problem: Potential for Harm to Self or Others  Goal: Cooperates with admission process  Outcome: Progressing  Goal: Participates in unit activities  Outcome: Progressing  Goal: Patient/Family participate in treatment and discharge plans  Outcome: Progressing  Goal: Identifies deescalation techniques  Outcome: Progressing  Goal: Understands least restrictive measures  Outcome: Progressing  Goal: Identifies stressors that lead to harmful behaviors  Outcome: Progressing  Goal: Notifies staff when experiencing harmful thoughts toward self/others  Outcome: Progressing  Goal: Denies harm toward self or others  Outcome: Progressing  Goal: Free from restraint events  Outcome: Progressing     Problem: Ineffective Coping  Goal: Cooperates with admission process  Outcome: Progressing  Goal: Identifies ineffective coping skills  Outcome: Progressing  Goal: Identifies healthy coping skills  Outcome: Progressing  Goal: Demonstrates healthy coping skills  Outcome: Progressing  Goal: Participates in unit activities  Outcome: Progressing  Goal: Patient/Family participate in treatment and discharge plans  Outcome: Progressing  Goal: Patient/Family verbalizes awareness of resources  Outcome: Progressing  Goal: Understands least restrictive measures  Outcome: Progressing  Goal: Free from restraint events  Outcome: Progressing     Problem: Alteration in Sleep  Goal: STG - Reports nightly sleep, duration, and quality  Outcome: Progressing  Goal: STG - Identifies sleep hygiene aids  Outcome: Progressing  Goal: STG - Informs staff if unable to sleep  Outcome: Progressing  Goal: STG - Attends breathing and relaxation group  Outcome: Progressing     Problem: Anxiety  Goal: Patient/family understands admission protocols  Outcome: Progressing  Goal: Attempts to manage anxiety with help  Outcome: Progressing  Goal: Verbalizes ways to manage  anxiety  Outcome: Progressing  Goal: Implements measures to reduce anxiety  Outcome: Progressing  Goal: Free from restraint events  Outcome: Progressing     Problem: Defensive Coping  Goal: Cooperates with admission process  Outcome: Progressing  Goal: Identifies reckless/dangerous behavior  Outcome: Progressing  Goal: Identifies stressors that lead to reckless/dangerous behavior  Outcome: Progressing  Goal: Discusses and identifies healthy coping skills  Outcome: Progressing  Goal: Demonstrates healthy coping skills  Outcome: Progressing  Goal: Identifies appropriate social interaction  Outcome: Progressing  Goal: Demonstrates appropriate social interactions  Outcome: Progressing  Goal: Patient/Family verbalizes awareness of resources  Outcome: Progressing  Goal: Discusses signs/symptoms of illness/treatment options  Outcome: Progressing  Goal: Patient/Family participate in treatment and discharge plans  Outcome: Progressing  Goal: Understands least restrictive measures  Outcome: Progressing  Goal: Free from restraint events  Outcome: Progressing     Problem: Pain  Goal: Takes deep breaths with improved pain control throughout the shift  Outcome: Progressing  Goal: Turns in bed with improved pain control throughout the shift  Outcome: Progressing  Goal: Walks with improved pain control throughout the shift  Outcome: Progressing  Goal: Performs ADL's with improved pain control throughout shift  Outcome: Progressing  Goal: Participates in PT with improved pain control throughout the shift  Outcome: Progressing  Goal: Free from opioid side effects throughout the shift  Outcome: Progressing  Goal: Free from acute confusion related to pain meds throughout the shift  Outcome: Progressing

## 2024-02-01 NOTE — PROGRESS NOTES
"Occupational Therapy     REHAB Therapy Assessment & Treatment    Patient Name: Moris Conklin  MRN: [id number]  Today's Date: 2/1/2024      Activity Assessment:       Control Versus Reaction Group: [id number]  Community Resources Group: [id number]  Pet Therapy as a Coping Tool Group: [id number]      1/3 Groups attended       Pt only participates in per therapy group this date as pt sleeping soundly during AM groups. During attended group, pt observed to be more social with select peers, brighter affect, and overall improved mood/emotion regulation as compared to previous dates. Pt engages briefly with the therapy dog and appropriately shares aloud \"Im better at interacting with my own animals, petting other peoples dogs is not really my thing\" Pt however, willing to remain in group and engage in group discussion. Pt with improvement this date despite only attending pet group as evidenced above. Pt would benefit from continued OT services in order to improve overall self-esteem, personal confidence and supports with increased awareness for safe transition location at discharge.      Encounter Problems       Encounter Problems (Active)       OT Goals       Pt will ID 1-2 LTG and 2-3 STG, including methods to achieve goals upon discharge (Progressing)       Start:  01/26/24    Expected End:  02/23/24            Pt will explore and ID 1-2 strategies to manage stressors/symptoms of illness/ grief more effectively prior to discharge.   (Progressing)       Start:  01/26/24    Expected End:  02/23/24            Pt will explore and ID 1-2 effective methods of expressing feelings, want and needs prior to discharge.   (Progressing)       Start:  01/26/24    Expected End:  02/23/24            Pt will ID 2-3 effective ways to distract from crisis and ID stressors/ personal symptoms of stress in order to improve management of stress during daily activities.   (Progressing)       Start:  01/26/24    Expected End:  02/23/24       "      Pt will appropriately participate in group and 1:1 sessions, 90% of the time (Progressing)       Start:  01/26/24    Expected End:  02/23/24                         Additional Comments:  ORELLANA collaborated with patients nurse and charge nurse throughout the day to provide the appropriate support and encouragement to attend groups. Pt up on unit when ORELLANA left last group of the day. All needs met.

## 2024-02-01 NOTE — DISCHARGE INSTRUCTIONS
PLEASE TAKE YOUR MEDICATION AS PRESCRIBED AND ATTEND YOUR FOLLOW-UP APPOINTMENT(S) AS SCHEDULED.     PLEASE CONTINUE TO ABSTAIN FROM /DO NOT USE ALCOHOL AND DRUGS (PHARMACEUTICAL OR STREET DRUGS) NOT PRESCRIBED TO YOU.      IN CASE OF EMERGENCY.  Call your outpatient psychiatrist right away or travel to the nearest emergency department if you have new or worsening mental health symptoms; unusual changes in behavior, mood, thoughts or feelings; thoughts of wanting to harm yourself or other people; or if you are experiencing serious medication side effects.   CALL 911 IN THE CASE OF AN EMERGENCY.     WHAT SHOULD I KNOW ABOUT STORAGE AND DISPOSAL OF MY MEDICATION(S)?  --Keep each medication in the container it came in, tightly closed, and out of reach of children.  --Take any medication that is outdated or no longer needed to your local police station for proper disposal.    WHAT OTHER INFORMATION SHOULD I KNOW?  --Keep all appointments with your doctor.  --Do not let anyone else take your medication(s). Ask your pharmacist any questions you have about refilling your prescription.  --It is important for you TO KEEP A WRITTEN LIST OF ALL THE PRESCRIPTION & NON-PRESCRIPTION (over-the-counter) MEDICINES YOU ARE TAKING, INCLUDING products such as vitamins, minerals, or other dietary supplements. You should bring this list with you each time you visit a doctor or if you are admitted to a hospital. It is also important information to carry with you in case of emergencies.      PLEASE BE AWARE that your recent abstinence from drugs while in the hospital PLACES YOU AT INCREASED RISK for unintentional overdose, and possibly death, if you were to relapse and start using drugs again.     Project MICHELINE (Deaths Avoided With Naltrexone) is a community-based overdose education and naloxone distribution program. Project MICHELINE participants receive training on: Recognizing the signs and symptoms of overdose; Distinguishing between  different types of overdose; Performing rescue breathing; Calling emergency medical services; Administering intranasal Naloxone.  You will be given a list of [facility] MICHELINE sites in [place], in case you would like more information.

## 2024-02-02 VITALS
RESPIRATION RATE: 16 BRPM | WEIGHT: 147.93 LBS | BODY MASS INDEX: 21.91 KG/M2 | SYSTOLIC BLOOD PRESSURE: 111 MMHG | TEMPERATURE: 97.2 F | DIASTOLIC BLOOD PRESSURE: 74 MMHG | OXYGEN SATURATION: 96 % | HEART RATE: 97 BPM | HEIGHT: 69 IN

## 2024-02-02 PROCEDURE — 2500000004 HC RX 250 GENERAL PHARMACY W/ HCPCS (ALT 636 FOR OP/ED): Performed by: INTERNAL MEDICINE

## 2024-02-02 PROCEDURE — 2500000001 HC RX 250 WO HCPCS SELF ADMINISTERED DRUGS (ALT 637 FOR MEDICARE OP): Performed by: PSYCHIATRY & NEUROLOGY

## 2024-02-02 PROCEDURE — 97150 GROUP THERAPEUTIC PROCEDURES: CPT | Mod: GO,CO

## 2024-02-02 PROCEDURE — 99239 HOSP IP/OBS DSCHRG MGMT >30: CPT | Performed by: NURSE PRACTITIONER

## 2024-02-02 PROCEDURE — 2500000001 HC RX 250 WO HCPCS SELF ADMINISTERED DRUGS (ALT 637 FOR MEDICARE OP): Performed by: NURSE PRACTITIONER

## 2024-02-02 RX ORDER — ERGOCALCIFEROL 1.25 MG/1
1250 CAPSULE ORAL
Status: DISCONTINUED | OUTPATIENT
Start: 2024-02-03 | End: 2024-02-02 | Stop reason: HOSPADM

## 2024-02-02 RX ORDER — FLUOXETINE HYDROCHLORIDE 40 MG/1
40 CAPSULE ORAL DAILY
Qty: 30 CAPSULE | Refills: 0 | Status: SHIPPED | OUTPATIENT
Start: 2024-02-02

## 2024-02-02 RX ORDER — ERGOCALCIFEROL 1.25 MG/1
1250 CAPSULE ORAL
Qty: 8 CAPSULE | Refills: 0 | Status: SHIPPED | OUTPATIENT
Start: 2024-02-03 | End: 2024-03-24

## 2024-02-02 RX ORDER — TAMSULOSIN HYDROCHLORIDE 0.4 MG/1
0.4 CAPSULE ORAL DAILY
Qty: 30 CAPSULE | Refills: 0 | Status: SHIPPED | OUTPATIENT
Start: 2024-02-02

## 2024-02-02 RX ORDER — CHOLECALCIFEROL (VITAMIN D3) 50 MCG
2000 TABLET ORAL DAILY
Qty: 30 TABLET | Refills: 0 | Status: SHIPPED | OUTPATIENT
Start: 2024-02-02 | End: 2024-02-02 | Stop reason: HOSPADM

## 2024-02-02 RX ORDER — OLANZAPINE 15 MG/1
15 TABLET ORAL NIGHTLY
Qty: 30 TABLET | Refills: 0 | Status: SHIPPED | OUTPATIENT
Start: 2024-02-02

## 2024-02-02 RX ADMIN — Medication 2000 UNITS: at 08:27

## 2024-02-02 RX ADMIN — FLUOXETINE 40 MG: 20 CAPSULE ORAL at 08:28

## 2024-02-02 RX ADMIN — TAMSULOSIN HYDROCHLORIDE 0.4 MG: 0.4 CAPSULE ORAL at 08:28

## 2024-02-02 ASSESSMENT — PAIN SCALES - GENERAL: PAINLEVEL_OUTOF10: 0 - NO PAIN

## 2024-02-02 ASSESSMENT — PAIN - FUNCTIONAL ASSESSMENT: PAIN_FUNCTIONAL_ASSESSMENT: 0-10

## 2024-02-02 ASSESSMENT — PAIN SCALES - WONG BAKER: WONGBAKER_NUMERICALRESPONSE: NO HURT

## 2024-02-02 NOTE — DISCHARGE INSTR - APPOINTMENTS
Community Mental Health Follow Up;  At   [facility],  Emergency Services,  Date: 02/05/2024 at 9:30 am, with Ruben.  At  [id number] [place] [address] [phone];  Fax [phone];

## 2024-02-02 NOTE — CARE PLAN
"The patient's goals for the shift include \"to get 6-8 hours of sleep tonight\"    The clinical goals for the shift include medication compliance    Over the shift, the patient did  make progress toward the following goals    Problem: Safety  Goal: Patient will be injury free during hospitalization  Outcome: Progressing  Goal: I will remain free of falls  Outcome: Progressing     Problem: Daily Care  Goal: Daily care needs are met  Outcome: Progressing     Problem: Psychosocial Needs  Goal: Demonstrates ability to cope with hospitalization/illness  Outcome: Progressing  Goal: Collaborate with me, my family, and caregiver to identify my specific goals  Outcome: Progressing     Problem: Sensory Perceptual Alteration as Evidenced by  Goal: Patient/Family participate in treatment and discharge plans  Outcome: Progressing  Goal: Participates in unit activities  Outcome: Progressing  Goal: Initiates reality-based interactions  Outcome: Progressing     Problem: Potential for Harm to Self or Others  Goal: Notifies staff when experiencing harmful thoughts toward self/others  Outcome: Progressing  Goal: Denies harm toward self or others  Outcome: Progressing     Problem: Ineffective Coping  Goal: Identifies healthy coping skills  Outcome: Progressing  Goal: Demonstrates healthy coping skills  Outcome: Progressing  Goal: Participates in unit activities  Outcome: Progressing     Problem: Alteration in Sleep  Goal: STG - Reports nightly sleep, duration, and quality  Outcome: Progressing  Goal: STG - Identifies sleep hygiene aids  Outcome: Progressing  Goal: STG - Informs staff if unable to sleep  Outcome: Progressing     Problem: Defensive Coping  Goal: Identifies appropriate social interaction  Outcome: Progressing  Goal: Demonstrates appropriate social interactions  Outcome: Progressing     "

## 2024-02-02 NOTE — CARE PLAN
Discussed in Treatment team today; plan is to discharge tomorrow, 02/02, back home; will talk to him also about [facility] as an option, if he is agreeable.  If he does not agree will resume with discharge back home, with follow up at [facility]. Sw to follow.

## 2024-02-02 NOTE — PROGRESS NOTES
"Occupational Therapy     REHAB Therapy Assessment & Treatment    Patient Name: Moris Conklin  MRN: [id number]  Today's Date: 2/2/2024      Activity Assessment:       Daily Positive [place]/Stretching Group: [id number]  Personal Awareness and Understanding Group: [id number]  Impulse Control/ Problem Solving Skills Group: [id number]    3/3 Groups attended     Pt present and engages well in all groups this date with improved confidence, increased social interaction, and improved insightful responses. Pt engages well in the stretching activity 100% of the time as well as shares aloud \"Im glad we are doing this, I like to think I am flexible in my body so this helps my mind stay flexible as well.\" Pt shares enjoyment of engaging in the personal awareness activity as well as asks appropriate questions of \"where to get one for discharge?\" Pt able to respond appropriately to all prompts during the activity as well as shares \"a perfect day for me would be being at home, hanging out with the people I love and doing some nicole\" During impulse control group, pt takes appropriate leadership role as well as appropriately asks to \"listen to some of the songs I wrote down in my journal, it would nice to hear some of my own preferred music\" Pt appropriately thanks this writer for \"letting me listen\" and overall continues to demo improvement toward OT goals as evidenced above. Pt would benefit from continued OT services in order to improve overall self-esteem, personal confidence and supports with increased awareness for safe transition location at discharge.        Encounter Problems       Encounter Problems (Active)       OT Goals       Pt will ID 1-2 LTG and 2-3 STG, including methods to achieve goals upon discharge (Progressing)       Start:  01/26/24    Expected End:  02/23/24            Pt will explore and ID 1-2 strategies to manage stressors/symptoms of illness/ grief more effectively prior to discharge.   (Progressing)       " Start:  01/26/24    Expected End:  02/23/24            Pt will explore and ID 1-2 effective methods of expressing feelings, want and needs prior to discharge.   (Progressing)       Start:  01/26/24    Expected End:  02/23/24            Pt will ID 2-3 effective ways to distract from crisis and ID stressors/ personal symptoms of stress in order to improve management of stress during daily activities.   (Progressing)       Start:  01/26/24    Expected End:  02/23/24            Pt will appropriately participate in group and 1:1 sessions, 90% of the time (Progressing)       Start:  01/26/24    Expected End:  02/23/24                     Additional Comments:    ORELLNAA collaborated with patients nurse and charge nurse throughout the day to provide the appropriate support and encouragement to attend groups. Pt up on unit when ORELLANA left last group of the day. All needs met.

## 2024-02-02 NOTE — CARE PLAN
"Addressed mom's concerns about patient's discharge today: told her Provider has evaluated him today and that he is improved and ready for discharge;  She stated he is not improved and is not ready to go, that she believes he needs more days here as an inpatient; Informed her that our Provider has assessed him, observed him and that, he is stable for discharge, and that he no longer meets medical necessity to remain on the inpatient unit. She stated she disagrees, that he is not medically stable and that he needs to stay;  Informed her that this is the Provider's decision;  she stated he \"cannot come home.  Where is he gonna go because he cannot come here, where is he gonna go if he is out in the cold, where is he gonna stay?\"  Informed her that he has contacted his Uncle and told this sw he will get a ride home from him.  Mother stated: \"I can't f*cking believe this.  He is not stable, he needs to stay there.  I will tell you this, if anything happens to him I will hold the hospital responsible.\" Informed her that she can work with [facility] over the weekend until his hospital discharge appointment on Monday, 02/05, as per his discharge appointment.  The phone conversation ended at that point.  This sw spoke to [facility] and faxed the discharge summary to Connie, Emergency Services Worker who stated she will follow up with the patient and family over the weekend prior to the appointment this Monday, 02/05.  Informed patient of mother's opposition to him coming home and he stated: \"she is just worried but it is over her head, I am 28 years old\";  Patient's Uncle Jose met with patient and told him, if he has any problems getting in to his house, has no where to go or any other issues, to call him and patient agreed.  Patient will follow up with [facility] for his ongoing community mental health care;   "
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
## 2024-02-02 NOTE — NURSING NOTE
Patient is in the lounge sitting at a table with other patients eating snack and watching sports on tv. Pt is engaged in answering assessment questions. Pt denied anxiety,denied depression,denied si/hi,denied a/v hallucinations. Pt is medication compliant.

## 2024-02-02 NOTE — NURSING NOTE
Interdisciplinary team met with the patient and the patient met criteria for discharge. The patient was given and reviewed signs and symptoms sheet regarding when to seek treatment. The crisis hotline was given and reviewed. The patient was provided information on all medications that he was prescribed upon discharge. The patient was minimally receptive to education. Discussed the importance of compliance with medications and follow up care to promote wellness, prevent exacerbation of illness/symptoms and to prevent readmission. The patient verbalized understanding of all discharge instructions and was able to repeat back how he is supposed to take his medications, purpose of them and when his follow up appointment is. The patient denies suicidal/homicidal ideation or safety concerns at the time of this assessment. The patient will be discharged home today. The patient was receptive to a follow up call at discharge. Jaziel Briones RN

## 2024-02-02 NOTE — NURSING NOTE
MHW informed me that we she was doing room checks she found a comb broke in half in this patients room. The broken comb was thrown in the garbage. Pt was in the shower at the time of the room check.

## 2024-02-02 NOTE — CARE PLAN
"The patient's goals for the shift include \"not really/get out today hopefully\"    The clinical goals for the shift include medication compliance    Over the shift, the patient did make progress toward the following goals. Barriers to progression include patients knowledge of prescribed medications. Recommendations to address these barriers include educate patient on his prescribed medications.      Problem: Daily Care  Goal: Daily care needs are met  Outcome: Progressing     Problem: Psychosocial Needs  Goal: Demonstrates ability to cope with hospitalization/illness  Outcome: Progressing     Problem: Discharge Barriers  Goal: My discharge needs are met  Outcome: Progressing       "

## 2024-02-05 NOTE — SIGNIFICANT EVENT
Follow Up Phone Call    Outgoing phone call    Spoke to: Moris Conklin Relationship:self   Phone number: [phone]      Outcome: I left a message on answering machine   No chief complaint on file.         Diagnosis:Not applicable

## 2024-02-27 RX ORDER — FLUOXETINE HYDROCHLORIDE 40 MG/1
40 CAPSULE ORAL DAILY
Qty: 30 CAPSULE | Refills: 0 | Status: SHIPPED | OUTPATIENT
Start: 2024-02-27 | End: 2024-03-28

## 2024-02-27 RX ORDER — OLANZAPINE 10 MG/1
15 TABLET ORAL NIGHTLY
Qty: 45 TABLET | Refills: 0 | Status: SHIPPED | OUTPATIENT
Start: 2024-02-27 | End: 2024-03-28

## 2024-02-27 RX ORDER — TAMSULOSIN HYDROCHLORIDE 0.4 MG/1
0.4 CAPSULE ORAL DAILY
Qty: 30 CAPSULE | Refills: 0 | Status: SHIPPED | OUTPATIENT
Start: 2024-02-27 | End: 2024-03-28

## 2024-02-27 NOTE — PROGRESS NOTES
Received phone call from mother, patient missed his outpatient appt was not feeling well, rescheduled for march. Requesting medication refill.

## 2024-02-27 NOTE — SIGNIFICANT EVENT
Patients mother called. Reports patient missed his appointment, it has been rescheduled for March. Requesting refill of prozac, zyprexa, flomax to get him until his next appointment. 30 day supply sent to [facility].